# Patient Record
Sex: MALE | Race: WHITE | NOT HISPANIC OR LATINO | Employment: OTHER | URBAN - METROPOLITAN AREA
[De-identification: names, ages, dates, MRNs, and addresses within clinical notes are randomized per-mention and may not be internally consistent; named-entity substitution may affect disease eponyms.]

---

## 2018-07-03 ENCOUNTER — TELEPHONE (OUTPATIENT)
Dept: ORTHOPEDIC SURGERY | Facility: CLINIC | Age: 68
End: 2018-07-03

## 2018-07-05 ENCOUNTER — OFFICE VISIT (OUTPATIENT)
Dept: ORTHOPEDIC SURGERY | Facility: CLINIC | Age: 68
End: 2018-07-05

## 2018-07-05 ENCOUNTER — TELEPHONE (OUTPATIENT)
Dept: ORTHOPEDIC SURGERY | Facility: CLINIC | Age: 68
End: 2018-07-05

## 2018-07-05 DIAGNOSIS — S82.831D CLOSED FRACTURE OF DISTAL END OF RIGHT FIBULA WITH ROUTINE HEALING, UNSPECIFIED FRACTURE MORPHOLOGY, SUBSEQUENT ENCOUNTER: Primary | ICD-10-CM

## 2018-07-05 DIAGNOSIS — S82.891A CLOSED FRACTURE OF RIGHT ANKLE, INITIAL ENCOUNTER: Primary | ICD-10-CM

## 2018-07-05 DIAGNOSIS — I82.4Y1 DEEP VEIN THROMBOSIS (DVT) OF PROXIMAL VEIN OF RIGHT LOWER EXTREMITY, UNSPECIFIED CHRONICITY (HCC): ICD-10-CM

## 2018-07-05 PROCEDURE — 99203 OFFICE O/P NEW LOW 30 MIN: CPT | Performed by: ORTHOPAEDIC SURGERY

## 2018-07-05 PROCEDURE — 27786 TREATMENT OF ANKLE FRACTURE: CPT | Performed by: ORTHOPAEDIC SURGERY

## 2018-07-05 RX ORDER — WARFARIN SODIUM 5 MG/1
5 TABLET ORAL DAILY
Qty: 20 TABLET | Refills: 0 | Status: SHIPPED | OUTPATIENT
Start: 2018-07-05 | End: 2018-07-23 | Stop reason: SDUPTHER

## 2018-07-05 RX ORDER — SIMVASTATIN 40 MG
TABLET ORAL
COMMUNITY
Start: 2018-06-30

## 2018-07-05 NOTE — TELEPHONE ENCOUNTER
Order has been placed for PT/INR to be checked weekly at  lab. Patient wife has been informed. Per Dr. Francois they will start Coumadin today or tomorrow and check on Monday for 4 Mondays

## 2018-07-05 NOTE — PROGRESS NOTES
Chief Complaint   Patient presents with   • Right Ankle - Establish Care   Patient slipped while mowing and fell on the right ankle on 6-29-18. He is having constant pain with aching and burning along with bruising and swelling.        HPI patient was mowing the grass when he slipped on a wet surface.  He had a twisting injury to his ankle and could not walk on his ankle.  Cause of the pain and discomfort.  He since then has developed swelling and a burning sensation on the lateral aspect of the ankle.  He finds it very difficult to stand upright and bear weight on the right lower extremity.  The patient was seen in the emergency room and diagnosed with a distal fibular fracture.  He was placed in a splint and sent to our office for further management.  He states that his symptoms have gotten a little bit better since the time of his injury.  At this point he would like to proceed with nonoperative care.  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          Allergies   Allergen Reactions   • Phenergan [Promethazine] Unknown (See Comments)     Unknown           Social History     Social History   • Marital status:      Spouse name: N/A   • Number of children: N/A   • Years of education: N/A     Occupational History   • Not on file.     Social History Main Topics   • Smoking status: Never Smoker   • Smokeless tobacco: Not on file   • Alcohol use No   • Drug use: Unknown   • Sexual activity: Not on file     Other Topics Concern   • Not on file     Social History Narrative   • No narrative on file       Family History   Problem Relation Age of Onset   • Heart disease Mother    • Cancer Father        No past surgical history on file.    Past Medical History:   Diagnosis Date   • History of stomach ulcers    • Sleep apnea            There were no vitals filed for this visit.          Review of  Systems   Constitutional: Negative.    HENT: Negative.    Eyes: Negative.    Respiratory: Negative.    Cardiovascular: Negative.    Gastrointestinal: Negative.    Endocrine: Negative.    Genitourinary: Negative.    Musculoskeletal: Positive for gait problem and joint swelling.   Skin: Negative.    Allergic/Immunologic: Negative.    Hematological: Negative.    Psychiatric/Behavioral: Negative.            Physical Exam   Constitutional: He is oriented to person, place, and time. He appears well-nourished.   HENT:   Head: Atraumatic.   Eyes: EOM are normal.   Neck: Neck supple.   Cardiovascular: Regular rhythm, normal heart sounds and intact distal pulses.    Pulmonary/Chest: Breath sounds normal.   Abdominal: Bowel sounds are normal.   Musculoskeletal: Normal range of motion.   Neurological: He is oriented to person, place, and time.   Skin: Skin is warm. Capillary refill takes 2 to 3 seconds.   Psychiatric: He has a normal mood and affect. His behavior is normal.   Vitals reviewed.              Joint/Body Part Specific Exam:Right ankle:  The ankle is swollen. Patient has tenderness laterally over the distal fibula at the level of the syndesmosis. There is also some tenderness medially over the deltoid ligament. The syndesmosis itself is stable. Dorsiflexion and plantarflexion are both restricted for the patient and consistent with the ankle fracture both on account of stiffness and swelling caused by the injury. There is a mild amount of pedal edema on the distal tibia.  Calf is soft and nontender. Patient has a palpable dorsalis pedis artery and the common peroneal nerve function is well preserved. There is no clinical deformity. No issues related to the hardware are noted. There are no clinical signs of instability.  External rotation and squeeze tests are negative. There is no proximal fibular tenderness.           X-RAY Report:  X-rays from an outside facility are reviewed.  The images show a fracture of the  distal tip of the fibula.  There is greater swelling of the soft tissue on the lateral side than on the medial side.  The ankle mortise appears to be stable.          Diagnostics:            Jose D was seen today for establish care.    Diagnoses and all orders for this visit:    Closed fracture of distal end of right fibula with routine healing, unspecified fracture morphology, subsequent encounter            Procedures          I provided this patient with educational materials regarding exercise, bone health and cast or splint care.        Plan: Nonoperative management of this distal fibular fracture has been discussed with the patient.    Application of a tall cam walking boot to immobilize the fracture.    Elevation to control swelling.    Ace wrap from toes to groin to help decrease the swelling and minimize the possibility of a DVT.    Tablet ibuprofen 600 mg orally twice a day for pain swelling and discomfort.    Tablet aspirin 325 mg tab 1 by mouth daily for DVT prophylaxis.    Use a pair of crutches for assistance with ambulation.    Calcium and vitamin D for bone health.    Follow-up in my office in 4 weeks for reevaluation of the healing of the fracture.          CC To Alessia Fernandez MD

## 2018-07-05 NOTE — TELEPHONE ENCOUNTER
In that case I would recommend coumadin 5mg po q day with biweekly monitoring of PT INR for 4 weeks please call in tab coumadin 5 mg po q day # 20

## 2018-07-05 NOTE — TELEPHONE ENCOUNTER
Patient wife has called stating he cannot take Aspirin or any NSAIDS due to a perforated ulcer. He was here this morning. Is there anything else they can do? Please Advise.

## 2018-07-09 ENCOUNTER — LAB (OUTPATIENT)
Dept: LAB | Facility: HOSPITAL | Age: 68
End: 2018-07-09

## 2018-07-09 DIAGNOSIS — S82.891A CLOSED FRACTURE OF RIGHT ANKLE, INITIAL ENCOUNTER: ICD-10-CM

## 2018-07-09 DIAGNOSIS — I82.4Y1 DEEP VEIN THROMBOSIS (DVT) OF PROXIMAL VEIN OF RIGHT LOWER EXTREMITY, UNSPECIFIED CHRONICITY (HCC): ICD-10-CM

## 2018-07-09 LAB
INR PPP: 1.33 (ref 0.9–1.1)
PROTHROMBIN TIME: 16.3 SECONDS (ref 11.7–14.2)

## 2018-07-09 PROCEDURE — 36415 COLL VENOUS BLD VENIPUNCTURE: CPT

## 2018-07-09 PROCEDURE — 85610 PROTHROMBIN TIME: CPT

## 2018-07-11 ENCOUNTER — TELEPHONE (OUTPATIENT)
Dept: ORTHOPEDIC SURGERY | Facility: CLINIC | Age: 68
End: 2018-07-11

## 2018-07-11 NOTE — TELEPHONE ENCOUNTER
Have spoken with the patient's wife regarding his ProTime.  PT for Monday was 16.3 INR is 1.3.  Will increase his Coumadin to alternate 5 mg with 7-1/2 mg every other day and will recheck his ProTime again on Monday per Dr. Francois

## 2018-07-16 ENCOUNTER — LAB (OUTPATIENT)
Dept: LAB | Facility: HOSPITAL | Age: 68
End: 2018-07-16

## 2018-07-16 DIAGNOSIS — S82.891A CLOSED FRACTURE OF RIGHT ANKLE, INITIAL ENCOUNTER: ICD-10-CM

## 2018-07-16 DIAGNOSIS — I82.4Y1 DEEP VEIN THROMBOSIS (DVT) OF PROXIMAL VEIN OF RIGHT LOWER EXTREMITY, UNSPECIFIED CHRONICITY (HCC): ICD-10-CM

## 2018-07-16 LAB
INR PPP: 2.35 (ref 0.9–1.1)
PROTHROMBIN TIME: 25.3 SECONDS (ref 11.7–14.2)

## 2018-07-16 PROCEDURE — 85610 PROTHROMBIN TIME: CPT

## 2018-07-16 PROCEDURE — 36415 COLL VENOUS BLD VENIPUNCTURE: CPT

## 2018-07-17 ENCOUNTER — TELEPHONE (OUTPATIENT)
Dept: ORTHOPEDIC SURGERY | Facility: CLINIC | Age: 68
End: 2018-07-17

## 2018-07-17 NOTE — TELEPHONE ENCOUNTER
ProTime from yesterday was 25.3 INR is 2.3 will continue his Coumadin alternating 5 mg was 7.5 mg every other day.  Will recheck his ProTime again on Monday per Dr. Francois

## 2018-07-18 ENCOUNTER — OFFICE VISIT (OUTPATIENT)
Dept: ORTHOPEDIC SURGERY | Facility: CLINIC | Age: 68
End: 2018-07-18

## 2018-07-18 VITALS — WEIGHT: 180 LBS | BODY MASS INDEX: 25.77 KG/M2 | TEMPERATURE: 97.7 F | HEIGHT: 70 IN

## 2018-07-18 DIAGNOSIS — M25.551 HIP PAIN, CHRONIC, RIGHT: Primary | ICD-10-CM

## 2018-07-18 DIAGNOSIS — M16.11 PRIMARY OSTEOARTHRITIS OF RIGHT HIP: ICD-10-CM

## 2018-07-18 DIAGNOSIS — G89.29 HIP PAIN, CHRONIC, RIGHT: Primary | ICD-10-CM

## 2018-07-18 PROCEDURE — 73502 X-RAY EXAM HIP UNI 2-3 VIEWS: CPT | Performed by: ORTHOPAEDIC SURGERY

## 2018-07-18 PROCEDURE — 99213 OFFICE O/P EST LOW 20 MIN: CPT | Performed by: ORTHOPAEDIC SURGERY

## 2018-07-18 NOTE — PROGRESS NOTES
Chief Complaint   Patient presents with   • Right Hip - Establish Care, Pain             HPI Patient is here today for right hip pain.The patient continues to have pain in the right hip and the groin.  He has difficulty in getting out of the chair from a seated position.  He describes his pain as a dull aching discomfort that never really gets better.  Intermittently, the pain is very severe and sharp and stabbing in character.  There is an associated popping and clicking in the groin which is most likely resulting from a degenerative labrum tear.  His symptoms are definitely worse when he is driving for a prolonged period of time.  He states that this discomfort has been going on for several years and is slowly getting worse.  He would like to stay as active as he did a few years ago but finds it very difficult to mobilize himself because of the hip pain and discomfort.  Unfortunately, recently he fell and sustained an ankle fracture of the right side and is being treated conservatively for the ankle fracture.  The patient has been advised by me to wait for hip surgical intervention till systems of the ankle fracture has healed and he has completed his therapy program.  The patient states that he is ready for hip replacement surgery because of the progressive intensity of his symptoms.          Allergies   Allergen Reactions   • Phenergan [Promethazine] Unknown (See Comments)     Unknown           Social History     Social History   • Marital status:      Spouse name: N/A   • Number of children: N/A   • Years of education: N/A     Occupational History   • Not on file.     Social History Main Topics   • Smoking status: Never Smoker   • Smokeless tobacco: Not on file   • Alcohol use No   • Drug use: Unknown   • Sexual activity: Not on file     Other Topics Concern   • Not on file     Social History Narrative   • No narrative on file       Family History   Problem Relation Age of Onset   • Heart disease Mother     • Cancer Father        History reviewed. No pertinent surgical history.    Past Medical History:   Diagnosis Date   • History of stomach ulcers    • Sleep apnea            Vitals:    07/18/18 1232   Temp: 97.7 °F (36.5 °C)             Review of Systems   Constitutional: Negative.    HENT: Negative.    Eyes: Negative.    Respiratory: Negative.    Cardiovascular: Negative.    Gastrointestinal: Negative.    Endocrine: Negative.    Genitourinary: Negative.    Musculoskeletal: Positive for gait problem and joint swelling.   Skin: Negative.    Allergic/Immunologic: Negative.    Hematological: Negative.    Psychiatric/Behavioral: Negative.            Physical Exam   Constitutional: He is oriented to person, place, and time. He appears well-nourished.   HENT:   Head: Atraumatic.   Eyes: EOM are normal.   Neck: Neck supple.   Cardiovascular: Normal rate.    Pulmonary/Chest: Breath sounds normal.   Abdominal: Bowel sounds are normal.   Musculoskeletal: He exhibits edema.   Neurological: He is alert and oriented to person, place, and time.   Skin: Skin is warm. Capillary refill takes 2 to 3 seconds.   Psychiatric: He has a normal mood and affect. His behavior is normal. Judgment and thought content normal.   Nursing note and vitals reviewed.              Joint/Body Part Specific Exam:  right hip. Neurovascular status is intact. Patient has a distant limp on the affected side. Internal and external rotations are associated with pain and discomfort. Anterior joint line pain and tenderness is significant. Stinchfield sign is positive. Figure of 4 sign is positive. Patient is unable to perform an active straight leg raise exam. Greater trochanter is tender. Crossover adduction test is positive. Cross body adduction is limited and painful for the patient. Patient has very significant limp and joint line tenderness anteriorly, posteriorly and medially. Dorsalis pedis and posterior tibial artery pulses are palpable. Common peroneal  nerve function is well preserved.          X-RAY Report:  right Hip X-Ray  Indication: Pain in the right hip with a limp  AP and lateral  Findings: Advanced osteoarthritis of the hip on the right side with complete loss of joint space and osteophyte formation  no bony lesion  Soft tissues within normal limits  decreased joint spaces  Hardware appropriately positioned not applicable      yes prior studies available for comparison.    X-RAY was ordered and reviewed by Brian Francois MD              Diagnostics:            Jose D was seen today for establish care and pain.    Diagnoses and all orders for this visit:    Hip pain, chronic, right  -     XR Hip With or Without Pelvis 2 - 3 View Right            Procedures          I provided this patient with educational materials regarding exercise and bone health.        Plan: Stretching and strengthening exercises of the hip flexors and abductors.    Tablet ibuprofen 600 mg orally twice a day for pain swelling and discomfort.    Calcium and vitamin D for bone health.    This patient is going to need a right hip replacement to manage his symptoms of pain and discomfort.    At this point the patient is recovering from a ankle fracture on the right side and therefore it is too early to schedule hip replacement surgery for him since he will not be able to participate both in the surgical process with an anterior approach and the subsequent physical therapy required after the hip arthroplasty.    Follow-up in my office in 4 weeks for reevaluation of the healing of the ankle fracture and then proceeding with hip replacement scheduling.    Risks and benefits and potential complications of the hip replacement surgery discussed with the patient and his family in detail in the office today.    Patient is being anticoagulated at this point because of his immobility from an ankle fracture that will up and get his cast and the risk of DVT is subsided significantly.      CC To  Alessia Fernandez MD

## 2018-07-19 PROBLEM — S82.831D CLOSED FRACTURE OF DISTAL END OF RIGHT FIBULA WITH ROUTINE HEALING: Status: ACTIVE | Noted: 2018-07-19

## 2018-07-23 ENCOUNTER — LAB (OUTPATIENT)
Dept: LAB | Facility: HOSPITAL | Age: 68
End: 2018-07-23

## 2018-07-23 ENCOUNTER — TELEPHONE (OUTPATIENT)
Dept: ORTHOPEDIC SURGERY | Facility: CLINIC | Age: 68
End: 2018-07-23

## 2018-07-23 DIAGNOSIS — S82.891A CLOSED FRACTURE OF RIGHT ANKLE, INITIAL ENCOUNTER: ICD-10-CM

## 2018-07-23 DIAGNOSIS — M25.551 RIGHT HIP PAIN: Primary | ICD-10-CM

## 2018-07-23 DIAGNOSIS — I82.4Y1 DEEP VEIN THROMBOSIS (DVT) OF PROXIMAL VEIN OF RIGHT LOWER EXTREMITY, UNSPECIFIED CHRONICITY (HCC): ICD-10-CM

## 2018-07-23 LAB
INR PPP: 2.6 (ref 0.9–1.1)
PROTHROMBIN TIME: 27.5 SECONDS (ref 11.7–14.2)

## 2018-07-23 PROCEDURE — 36415 COLL VENOUS BLD VENIPUNCTURE: CPT

## 2018-07-23 PROCEDURE — 85610 PROTHROMBIN TIME: CPT

## 2018-07-23 RX ORDER — WARFARIN SODIUM 5 MG/1
5 TABLET ORAL DAILY
Qty: 20 TABLET | Refills: 0 | Status: SHIPPED | OUTPATIENT
Start: 2018-07-23 | End: 2020-06-16

## 2018-07-23 NOTE — TELEPHONE ENCOUNTER
ProTime today was 27.5 INR is 2.6.  Have instructed the patient's wife to have him continue taking Coumadin alternating 5 mg was 7.5 mg every other day and will recheck his ProTime again on Monday per Dr. Francois

## 2018-07-23 NOTE — TELEPHONE ENCOUNTER
Coumadin has been entered and sent to pharmacy and that he needs to continue taking it like he was.  Then I called the patient letting her know what NABILA wants in regards tot he Coumadin.

## 2018-07-23 NOTE — TELEPHONE ENCOUNTER
Okay to refill the Coumadin.  Okay to take it as he has been taking it previously.  However please make sure that the patient gets in touch with their PCP so that they can control the dosage of the Coumadin and the check on the PT/INR regularly.

## 2018-07-24 ENCOUNTER — TELEPHONE (OUTPATIENT)
Dept: ORTHOPEDIC SURGERY | Facility: CLINIC | Age: 68
End: 2018-07-24

## 2018-07-24 NOTE — TELEPHONE ENCOUNTER
Have notified the patient that he will be on the Coumadin for a total of 4 weeks and after that Dr. Francois is recommending aspirin 325 mg 1 tablet daily for 4 weeks.  The patient has a history of ulcers and has had stomach surgery and cannot take any kind of aspirin due to increased risk of bleeding.  Discussed with Dr. Francois and then get back with the patient

## 2018-07-24 NOTE — TELEPHONE ENCOUNTER
----- Message from Brian Francois MD sent at 7/23/2018 10:48 PM EDT -----  Yes please.  Left us follow the PT/INR levels.  And really at about 4 weeks we can discontinue the Coumadin and place him on some 325 aspirin a day for another 4 weeks.Thanks  ----- Message -----  From: Mary Mcneil RN  Sent: 7/23/2018   4:54 PM  To: Brian Francois MD    This patient was started on Coumadin by you on 7/5/18 for DVT prophylaxis after an ankle injury.  I was instructed by your MA to keep him on it for 4 weeks.  However, when a message was sent to you about refill and questions about Coumadin, you told MA to have him follow up with PCP.  Patient was not on long term Coumadin.  Are you OK if I continue to follow it for the 4 weeks

## 2018-07-25 ENCOUNTER — TELEPHONE (OUTPATIENT)
Dept: ORTHOPEDIC SURGERY | Facility: CLINIC | Age: 68
End: 2018-07-25

## 2018-07-25 NOTE — TELEPHONE ENCOUNTER
Have discussed with Dr. Francois.  Patient apparently is unable to take aspirin due to GI intolerance any past history of bleeding ulcers.  Patient will be 4 weeks postop at the end of this week.  Have advised him just to continue his Coumadin through Sunday of this week and then he can DC his Coumadin and no longer needs to have a pro times drawn per Dr. Francois

## 2018-07-29 PROBLEM — M16.11 PRIMARY OSTEOARTHRITIS OF RIGHT HIP: Status: ACTIVE | Noted: 2018-07-29

## 2018-08-07 ENCOUNTER — OFFICE VISIT (OUTPATIENT)
Dept: ORTHOPEDIC SURGERY | Facility: CLINIC | Age: 68
End: 2018-08-07

## 2018-08-07 DIAGNOSIS — M16.11 PRIMARY OSTEOARTHRITIS OF RIGHT HIP: ICD-10-CM

## 2018-08-07 DIAGNOSIS — S82.891D CLOSED FRACTURE OF RIGHT ANKLE WITH ROUTINE HEALING, SUBSEQUENT ENCOUNTER: Primary | ICD-10-CM

## 2018-08-07 PROCEDURE — 99024 POSTOP FOLLOW-UP VISIT: CPT | Performed by: ORTHOPAEDIC SURGERY

## 2018-08-07 PROCEDURE — 73600 X-RAY EXAM OF ANKLE: CPT | Performed by: ORTHOPAEDIC SURGERY

## 2018-08-07 NOTE — PROGRESS NOTES
Chief Complaint   Patient presents with   • Right Ankle - Follow-up   Patient is here today following up on his right ankle.      HPI the patient is doing very well with his right distal fibular fracture which has been treated with conservative, nonoperative care.  He does not have a clinical deformity.  The swelling and bruising are subsiding consistent with a healing fracture of the distal fibula.  Unfortunately his right hip continues to bother him significantly.  His pain and discomfort of the hip has actually become a little bit worse because of the altered weightbearing dynamics of the lower extremity since he sustained this ankle fracture about 6-8 weeks ago.  He states that he is thinking about a hip replacement surgery in the near future to improve his quality of life and to maintain his high level of mobility without pain.        There were no vitals filed for this visit.        Joint/Body Part Specific Exam:  Right ankle: The tenderness over the distal fibular fracture has started to settle down.  Dorsiflexion 0-20°.  Plantar flexion 0-30°.  He is able to circumduct his ankle without too much difficulty.  Skin and soft tissue swelling have subsided considerably.  Dorsalis pedis artery pulses are palpable.  Gait is cautious but otherwise completely normal.  The syndesmosis is stable.      X-RAY REPORT:  right Ankle X-Ray  Indication: Evaluation of healing of a distal fibular fracture  Views: AP, Lateral  Findings: Anatomically acceptable position of the fracture fragments with callus formation noted  yes fracture  no bony lesion  Soft tissues within normal limits  within normal limits joint spaces  Hardware appropriately positioned not applicable    yes prior studies available for comparison.    X-RAY was ordered and reviewed by Brian Francois MD        Jose D was seen today for follow-up.    Diagnoses and all orders for this visit:    Closed fracture of right ankle with routine healing, subsequent  encounter  -     XR Ankle 2 View Right            Procedures        Instructions:      Plan: Wean off the cam walking boot.    Application of an active ankle brace to help support the bone to heal and the soft tissues to heal as well.    Reinjury precautions.    Calcium and vitamin D for bone health.    Home-based exercise program with stretching and strengthening exercises of the dorsi and plantar flexors of the ankle.    The patient is going to need consideration for hip replacement surgery in the near future and we have talked about that.  I would like to defer surgery until such time that his ankle has fully resolved and he is able to ambulate with a weightbearing as tolerated status.  Patient will discuss the condition of his hip and the proposed procedures at his next visit to the office.    Follow-up in 2 months for reevaluation.

## 2018-08-14 PROBLEM — S82.891A CLOSED FRACTURE OF RIGHT ANKLE: Status: ACTIVE | Noted: 2018-08-14

## 2018-10-09 ENCOUNTER — OFFICE VISIT (OUTPATIENT)
Dept: ORTHOPEDIC SURGERY | Facility: CLINIC | Age: 68
End: 2018-10-09

## 2018-10-09 DIAGNOSIS — M16.11 PRIMARY OSTEOARTHRITIS OF RIGHT HIP: ICD-10-CM

## 2018-10-09 DIAGNOSIS — S82.891D CLOSED FRACTURE OF RIGHT ANKLE WITH ROUTINE HEALING, SUBSEQUENT ENCOUNTER: ICD-10-CM

## 2018-10-09 DIAGNOSIS — M25.551 RIGHT HIP PAIN: Primary | ICD-10-CM

## 2018-10-09 PROCEDURE — 73600 X-RAY EXAM OF ANKLE: CPT | Performed by: ORTHOPAEDIC SURGERY

## 2018-10-09 PROCEDURE — 99214 OFFICE O/P EST MOD 30 MIN: CPT | Performed by: ORTHOPAEDIC SURGERY

## 2018-10-09 PROCEDURE — 72170 X-RAY EXAM OF PELVIS: CPT | Performed by: ORTHOPAEDIC SURGERY

## 2018-10-09 NOTE — PROGRESS NOTES
Chief Complaint   Patient presents with   • Right Ankle - Follow-up   • Right Hip - Follow-up   Patient is here today following up on a right ankle fracture as well as right hip pain.      HPI the patient's ankle fracture is healing well.  Range of motion is improved significantly.  He does not have a clinical deformity.  The patient states that his pain has decreased considerably.  At this point he wants to go back to the preoperative planning stage for his hip replacement surgery.  In fact, we were planning to schedule this surgery earlier this year when he fell and unfortunately broke his ankle.  That put the plans for hip arthroplasty on file.  The patient states that the hip replacement is now very important for him to improve his mobility her maintain his quality of life.  He states that now that his ankle feels well with the healing process his hip hurts and continuously.  He has tried anti-inflammatory medication as well as crutches and a walker but still has quite a bit of pain and discomfort area internal and external rotation of the hip bother him significantly.  By the end of the day, he has a very significant limp.  His right leg with significant pain in the groin.  There are no risk factors for avascular necrosis.        There were no vitals filed for this visit.        Joint/Body Part Specific Exam:  right hip. Neurovascular status is intact. Patient has a distant limp on the affected side. Internal and external rotations are associated with pain and discomfort. Anterior joint line pain and tenderness is significant. Stinchfield sign is positive. Figure of 4 sign is positive. Patient is unable to perform an active straight leg raise exam. Greater trochanter is tender. Crossover adduction test is positive. Cross body adduction is limited and painful for the patient. Patient has very significant limp and joint line tenderness anteriorly, posteriorly and medially. Dorsalis pedis and posterior tibial  artery pulses are palpable. Common peroneal nerve function is well preserved.    Right ankle: The tenderness over the distal fibula is completely subsided.  He is neurovascularly intact.  Dorsiflexion 0-20°.  Plantar flexion 0-30°.  There is no clinical deformity.  The syndesmosis is stable.  The patient can circumduct his ankle without too much difficulty.  Gait is cautious and somewhat tentative because of the pain and swelling.  X-RAY REPORT:  Pelvis X-Ray  Indication: Evaluation of pain and discomfort of the right hip  AP and Frogleg views  Findings: Advanced degenerative osteoarthritis of the hip with narrowing of the joint space and subchondral sclerosis  no bony lesion  Soft tissues within normal limits  decreased joint spaces  Hardware appropriately positioned not applicable      yes prior studies available for comparison.    X-RAY was ordered and reviewed by Brian Francois MD    right Ankle X-Ray  Indication: Evaluation of healing of a distal fibular fracture  Views: AP, Lateral  Findings: Healed distal fibular, [place] type B fracture  yes fracture  no bony lesion  Soft tissues within normal limits  within normal limits joint spaces  Hardware appropriately positioned not applicable    yes prior studies available for comparison.    X-RAY was ordered and reviewed by Brian Francois MD        Jose D was seen today for follow-up and follow-up.    Diagnoses and all orders for this visit:    Right hip pain  -     XR Pelvis 1 or 2 View    Closed fracture of right ankle with routine healing, subsequent encounter  -     XR Ankle 2 View Right            Procedures        Instructions:      Plan: The patient's ankle is healing really well at this point.    Elevation to control swelling.  When necessary purposes.    Uses supportive brace on the ankle.    He has tried using a wheelchair, crutches, walker as well as ibuprofen for the pain and discomfort in his right hip.  He wants to now start with the process of the hip  arthroplasty on the right side.    The patient states he wants to improve his quality of life and not be dependent on its for walking.    Risks and benefits of total hip arthroplasty discussed with the patient as well as his wife was present in the office today.    Time spent in the office today with the patient is 30 minutes in the discussion of the surgical planning and the rationale for surgical intervention.    Greater than 50% of my time was spent face-to-face with this patient going over the treatment options, the risks benefits and postoperative management of hip replacement surgery.    The patient was seen today for preoperative discussion.  The patient has been tried on over-the-counter and prescription NSAID's despite the risks of anti-inflammatory bleeding, peptic ulcers and erosive gastritis with short term benefit only.  Braces have been prescribed for mechanical support.  Patient has been participating in an exercise program specifically targeting joint pain relief with limited benefit. Intraarticular injections have been used periodically with some but not complete relief of pain.  Ambulation aids have also been utilized.      The details of the surgical procedure were explained including the location of probable incisions and a description of the likely hardware/grafts to be used. The patient understands the likely convalescence after surgery as well as the rehabilitation required.  Also, we have thoroughly discussed with the patient the risks, benefits and alternatives to surgery.  Risks include but are not limited to the risk of infection, joint stiffness, limited range of motion, wound healing problems, scar tissue build up, myocardial infarction, stroke, blood clots (including DVT and/or pulmonary embolus along with the risk of death) neurologic and/or vascular injury, limb length discrepancy, fracture, dislocation, nonunion, malunion, continued pain and need for further surgery including  hardware failure requiring revision.

## 2018-10-23 ENCOUNTER — TELEPHONE (OUTPATIENT)
Dept: ORTHOPEDIC SURGERY | Facility: CLINIC | Age: 68
End: 2018-10-23

## 2018-11-26 ENCOUNTER — OUTSIDE FACILITY SERVICE (OUTPATIENT)
Dept: ORTHOPEDIC SURGERY | Facility: CLINIC | Age: 68
End: 2018-11-26

## 2018-11-26 PROCEDURE — 27130 TOTAL HIP ARTHROPLASTY: CPT | Performed by: ORTHOPAEDIC SURGERY

## 2018-11-27 ENCOUNTER — TELEPHONE (OUTPATIENT)
Dept: ORTHOPEDIC SURGERY | Facility: CLINIC | Age: 68
End: 2018-11-27

## 2018-11-27 NOTE — TELEPHONE ENCOUNTER
SNEHA FROM [facility] IN [place] CALLED REQUESTING A PHYSICAL THERAPY ORDER FOR PATIENT AND DR. CALDWELL'S PROTOCOL BE FAXED [phone].  PATIENT IS S/P RIGHT TOTAL HIP REPLACEMENT 11/26/18.  PATIENT BEGINS THERAPY 11/28/18

## 2018-11-29 ENCOUNTER — TELEPHONE (OUTPATIENT)
Dept: ORTHOPEDIC SURGERY | Facility: CLINIC | Age: 68
End: 2018-11-29

## 2018-11-29 RX ORDER — ONDANSETRON 4 MG/1
4 TABLET, FILM COATED ORAL EVERY 8 HOURS PRN
Qty: 40 TABLET | Refills: 1 | Status: SHIPPED | OUTPATIENT
Start: 2018-11-29 | End: 2020-06-16

## 2018-12-07 ENCOUNTER — TELEPHONE (OUTPATIENT)
Dept: ORTHOPEDIC SURGERY | Facility: CLINIC | Age: 68
End: 2018-12-07

## 2018-12-07 NOTE — TELEPHONE ENCOUNTER
DAHIANA FROM DR. KAILA CASTELLANO'S OFFICE (PATIENT'S PCP) CALLED AND STATED THAT PATIENT HAS GROSS HEMATURIA AND DR. CASTELLANO BELIEVES THAT IT'S A RESULT OF THE XARELTO THAT PATIENT IS TAKING.  PATIENT IS S/P RIGHT TOTAL HIP REPLACEMENT ON 11/26/18.  DR. CASTELLANO WANTS TO KNOW DR. CALDWELL'S RECOMMENDATION ON HOW TO PROCEED.  PLEASE ADVISE.

## 2018-12-07 NOTE — TELEPHONE ENCOUNTER
D/D Xarelsidney. It is okay for the pt to take Aspirin 325MG by mouth daily for 30 days. Thanks SARAH

## 2018-12-07 NOTE — TELEPHONE ENCOUNTER
CALLED DAHIANA @ DR. SRIVASTAVA'S OFFICE AND GAVE HER DR. CALDWELL'S INSTRUCTIONS.  PATIENT'S WIFE WAS SIMULTANEOUSLY ON THE PHONE WITH [facility] AND WAS TOLD THAT PATIENT SHOULD DISCONTINUE XARELTO AND BEGIN ASPIRIN THERAPY.  PATIENT IS ALLERGIC TO ASPIRIN SO DR. CALDWELL STATED FOR PATIENT NOT TO TAKE ANYTHING.  ALL PARTIES HAVE BEEN ADVISED AND IF DR. SRIVASTAVA CHANGES ANYTHING, SHE WILL CONTACT PATIENT AND COMMUNICATE WITH OUR OFFICE AS WELL

## 2018-12-14 ENCOUNTER — OFFICE VISIT (OUTPATIENT)
Dept: ORTHOPEDIC SURGERY | Facility: CLINIC | Age: 68
End: 2018-12-14

## 2018-12-14 DIAGNOSIS — Z96.641 STATUS POST TOTAL HIP REPLACEMENT, RIGHT: Primary | ICD-10-CM

## 2018-12-14 PROCEDURE — 73502 X-RAY EXAM HIP UNI 2-3 VIEWS: CPT | Performed by: ORTHOPAEDIC SURGERY

## 2018-12-14 PROCEDURE — 99024 POSTOP FOLLOW-UP VISIT: CPT | Performed by: ORTHOPAEDIC SURGERY

## 2018-12-14 NOTE — PROGRESS NOTES
Chief Complaint   Patient presents with   • Right Hip - Post-op         HPI the patient is here today for a follow up of his right total hip arthroplasty that was done on 11/26/18.  The patient is doing extremely well postoperatively.  There is no limb length discrepancy.  His neurovascular status is intact.  He is ambulating well with the assistance of a walker.  Physical therapy is helping him improve his mobility.  He does not have any significant pain at this point.  He is not using any narcotics to control his pain.  There are no fevers, rigors or chills.  The patient is very pleased with his postoperative outcome.        There were no vitals filed for this visit.        Joint/Body Part Specific Exam:right hip. Patient is post op 2.5 week(s) from total hip arthoplasty, direct anterior. Incision is clean and healing well. Calf is soft and nontender. Homans sign is negative. Skin and soft tissues are appropriate for postoperative status of the patient. Hip flexion is 0-80° degrees, hip abduction is 0-to 30° degrees. No limb lenth discrepancy. Neurovascular status is intact. Patients gait is significantly improved. The pain relief is extremely dramatic for the patient. Dorsalis pedis and posterior tibial artery pulses palpable. Common peroneal function is well preserved. There is no evidence of cellulitis or erythema or deep seated joint infection.        X-RAY REPORT:        Jose D was seen today for post-op.    Diagnoses and all orders for this visit:    Status post total hip replacement, right            Procedures        Instructions:      Plan: Incision care.    Falls precautions.    Dislocation precaution.    Tablet ibuprofen 600 mg orally twice a day for pain swelling and discomfort.    Continue with outpatient physical therapy to improve the range of motion and to improve the gait patterns.    Patient could not complete his course of xarelto for DVT prophylaxis.  We have discussed about the use of aspirin  as well as mechanical prophylaxis to prevent a DVT following hip replacement surgery.    Follow-up in my office in 8 weeks for reevaluation.

## 2018-12-15 PROBLEM — Z96.641 STATUS POST TOTAL HIP REPLACEMENT, RIGHT: Status: ACTIVE | Noted: 2018-12-15

## 2019-02-21 ENCOUNTER — OFFICE VISIT (OUTPATIENT)
Dept: ORTHOPEDIC SURGERY | Facility: CLINIC | Age: 69
End: 2019-02-21

## 2019-02-21 DIAGNOSIS — M16.11 PRIMARY OSTEOARTHRITIS OF RIGHT HIP: ICD-10-CM

## 2019-02-21 DIAGNOSIS — Z96.641 STATUS POST TOTAL HIP REPLACEMENT, RIGHT: Primary | ICD-10-CM

## 2019-02-21 PROCEDURE — 99024 POSTOP FOLLOW-UP VISIT: CPT | Performed by: ORTHOPAEDIC SURGERY

## 2019-09-05 ENCOUNTER — OFFICE VISIT (OUTPATIENT)
Dept: ORTHOPEDIC SURGERY | Facility: CLINIC | Age: 69
End: 2019-09-05

## 2019-09-05 VITALS — WEIGHT: 174 LBS | TEMPERATURE: 98.1 F | BODY MASS INDEX: 25.77 KG/M2 | HEIGHT: 69 IN

## 2019-09-05 DIAGNOSIS — M25.512 LEFT SHOULDER PAIN, UNSPECIFIED CHRONICITY: Primary | ICD-10-CM

## 2019-09-05 DIAGNOSIS — Z96.641 STATUS POST TOTAL HIP REPLACEMENT, RIGHT: ICD-10-CM

## 2019-09-05 PROCEDURE — 99213 OFFICE O/P EST LOW 20 MIN: CPT | Performed by: ORTHOPAEDIC SURGERY

## 2019-09-05 PROCEDURE — 73030 X-RAY EXAM OF SHOULDER: CPT | Performed by: ORTHOPAEDIC SURGERY

## 2019-09-05 NOTE — PROGRESS NOTES
NEW/FOLLOW UP VISIT    Patient: Jose D Bocanegra  ?  YOB: 1950    MRN: [id number]  ?  Chief Complaint   Patient presents with   • Left Shoulder - Pain      ?  HPI: The patient is following up on a total hip arthroplasty.  His hip doing really well and he doesn't even think about that joint anymore.  He is very pleased with the outcome of his hip arthroplasty.  He is now complaining of his left shoulder.  He has weakness in abduction and difficulty in sleeping in bed at night.  Most of his symptoms are marked at the 90 degree abduction meliton.  He has pain with cross body activities and denies any history of trauma.  He states that the pain is on the lateral side of the arm and associated with some numbness and tingling.  I am concerned about a rotator cuff injury.  We will check an MRI of the shoulder to evaluate the integrity of the rotator cuff versus a labrum tear.    Pain Location: left shoulder(s)  Radiation: none  Quality: sharp, stabbing  Intensity/Severity: moderate  Duration: 2-3 years  Onset quality: gradual   Timing: intermittent  Aggravating Factors: reaching forward, reaching backward, reaching across the body, repetitive motion  Alleviating Factors: OTC analgesics, NSAID's  Previous Episodes: yes  Associated Symptoms: swelling, decreased ROM, decreased strength  ADLs Affected: grooming/hygiene/toileting/personal care, work related activities, recreational activities/sports  Previous Treatment: nsaids and PT    This patient is an established patient.  This problem is new to this examiner.      Allergies:   Allergies   Allergen Reactions   • Aspirin Other (See Comments) and GI Intolerance     History of ulcers   • Phenergan [Promethazine] Unknown (See Comments)     Unknown         Medications:   Home Medications:  Current Outpatient Medications on File Prior to Visit   Medication Sig   • ondansetron (ZOFRAN) 4 MG tablet Take 1 tablet by mouth Every 8 (Eight) Hours As Needed for Nausea or  "Vomiting.   • simvastatin (ZOCOR) 40 MG tablet    • warfarin (COUMADIN) 5 MG tablet Take 1 tablet by mouth Daily.     No current facility-administered medications on file prior to visit.      Current Medications:  Scheduled Meds:  PRN Meds:.    I have reviewed the patient's medical history in detail and updated the computerized patient record.  Review and summarization of old records include:    Past Medical History:   Diagnosis Date   • History of stomach ulcers    • Sleep apnea      No past surgical history on file.  Social History     Occupational History   • Not on file   Tobacco Use   • Smoking status: Never Smoker   Substance and Sexual Activity   • Alcohol use: No   • Drug use: Not on file   • Sexual activity: Not on file      Social History     Social History Narrative   • Not on file     Family History   Problem Relation Age of Onset   • Heart disease Mother    • Cancer Father          Review of Systems   Constitutional: Negative.  Negative for fever.   HENT: Negative.    Eyes: Negative.    Respiratory: Negative.    Cardiovascular: Negative.    Gastrointestinal: Negative.    Endocrine: Negative.    Genitourinary: Negative.    Musculoskeletal: Positive for arthralgias, gait problem and joint swelling.   Skin: Negative.  Negative for rash and wound.   Allergic/Immunologic: Negative.    Neurological: Negative for numbness.   Hematological: Negative.    Psychiatric/Behavioral: Negative.           Wt Readings from Last 3 Encounters:   09/05/19 78.9 kg (174 lb)   07/18/18 81.6 kg (180 lb)     Ht Readings from Last 3 Encounters:   09/05/19 175.3 cm (69\")   07/18/18 177.8 cm (70\")     Body mass index is 25.7 kg/m².  Facility age limit for growth percentiles is 20 years.  Vitals:    09/05/19 1101   Temp: 98.1 °F (36.7 °C)         Physical Exam  Constitutional: Patient is oriented to person, place, and time. Appears well-developed and well-nourished.   HENT:   Head: Normocephalic and atraumatic.   Eyes: Conjunctivae " and EOM are normal. Pupils are equal, round, and reactive to light.   Cardiovascular: Normal rate, regular rhythm, normal heart sounds and intact distal pulses.   Pulmonary/Chest: Effort normal and breath sounds normal.   Musculoskeletal:   See detailed exam below   Neurological: Alert and oriented to person, place, and time. No sensory deficit. Coordination normal.   Skin: Skin is warm and dry. Capillary refill takes less than 2 seconds. No rash noted. No erythema.   Psychiatric: Patient has a normal mood and affect. His behavior is normal. Judgment and thought content normal.   Nursing note and vitals reviewed.      Ortho Exam:   Left shoulder (rct). The shoulder is extremely tender anteriorly. There is tenderness over the insertion of the rotator cuff over the greater tuberosity of the humerus. Slight proximal migration of the humeral head is noted. AC joint is tender. Crossover adduction test is positive. Drop arm sign is positive. Forward flexion is 0-110 degrees, abduction is 0-110 degrees, external rotation is 0-40 degrees. Patient has mild atrophy both of the supra and infraspinatus fossa. Sulcus sign is negative. There is no evidence of multidirectional instability. Neer test is positive on compression. Skin and soft tissue tenderness is noted. Patient's strength in abduction and external rotation are extremely lacking. The pain level is 6.    Right hip. Patient is post op several month(s) from total hip arthoplasty, direct anterior. Incision is clean and healing well. Calf is soft and nontender. Homans sign is negative. Skin and soft tissues are appropriate for postoperative status of the patient. Hip flexion is 0-90 degrees, hip abduction is 0-30 degrees. No limb lenth discrepancy. Neurovascular status is intact. Patients gait is significantly improved. The pain relief is extremely dramatic for the patient. Dorsalis pedis and posterior tibial artery pulses palpable. Common peroneal function is well  preserved. There is no evidence of cellulitis or erythema or deep seated joint infection.        Diagnostics:  xrays obtained today  left Shoulder X-Ray  Indication: Evaluation of pain and discomfort with limited range of motion of the shoulder.  AP and Lateral  Findings: Narrowing of the AC joint space with sclerosis over the greater tuberosity the humerus.  no bony lesion  Soft tissues decreased  within normal limits joint spaces  Hardware appropriately positioned not applicable      no prior studies available for comparison.    X-RAY was ordered and reviewed by Brian Francois MD        Assessment:  Jose D was seen today for pain.    Diagnoses and all orders for this visit:    Left shoulder pain, unspecified chronicity  -     XR Shoulder 2+ View Left  -     MRI shoulder left wo contrast; Future          Procedures  ?    Plan    · Schedule an MRI of the left shoulder for evaluation of intra-articular pathology such as a cuff tear versus a labrum tear that might require surgical intervention.  · Rest, ice, compression, and elevation (RICE) therapy  · Stretching and strengthening exercises of the rotator cuff muscles to prevent arthrofibrosis and a frozen shoulder.  · Intra-articular steroid injection for shoulder symptom control discussed and offered to the patient.  He will think about it and let me know.    · Dislocation precautions for the total hip arthroplasty.  · , GI and dental procedure prophylaxis with antibiotics to prevent metastatic infection to the hip arthroplasty implants.  · Tylenol 500-1000mg by mouth every 6 hours as needed for pain   · Follow up in 6 week(s) to discuss the findings of the MRI and to make further recommendations for any possible surgical intervention based on the pathology.    Date of encounter: 09/05/2019   Brian Francois MD

## 2019-09-17 ENCOUNTER — OFFICE VISIT (OUTPATIENT)
Dept: ORTHOPEDIC SURGERY | Facility: CLINIC | Age: 69
End: 2019-09-17

## 2019-09-17 VITALS — WEIGHT: 176.6 LBS | HEIGHT: 70 IN | BODY MASS INDEX: 25.28 KG/M2

## 2019-09-17 DIAGNOSIS — M75.42 SUBACROMIAL IMPINGEMENT OF LEFT SHOULDER: Primary | ICD-10-CM

## 2019-09-17 DIAGNOSIS — M25.512 ACUTE PAIN OF LEFT SHOULDER: ICD-10-CM

## 2019-09-17 PROCEDURE — 20610 DRAIN/INJ JOINT/BURSA W/O US: CPT | Performed by: PHYSICIAN ASSISTANT

## 2019-09-17 PROCEDURE — 99214 OFFICE O/P EST MOD 30 MIN: CPT | Performed by: PHYSICIAN ASSISTANT

## 2019-09-17 RX ADMIN — METHYLPREDNISOLONE ACETATE 160 MG: 80 INJECTION, SUSPENSION INTRA-ARTICULAR; INTRALESIONAL; INTRAMUSCULAR; SOFT TISSUE at 14:54

## 2019-09-17 RX ADMIN — LIDOCAINE HYDROCHLORIDE 2 ML: 10 INJECTION, SOLUTION EPIDURAL; INFILTRATION; INTRACAUDAL; PERINEURAL at 14:54

## 2019-09-17 NOTE — PROGRESS NOTES
Large Joint Arthrocentesis: L subacromial bursa  Date/Time: 9/17/2019 2:54 PM  Consent given by: patient  Site marked: site marked  Timeout: Immediately prior to procedure a time out was called to verify the correct patient, procedure, equipment, support staff and site/side marked as required   Supporting Documentation  Indications: pain   Procedure Details  Location: shoulder - L subacromial bursa  Preparation: Patient was prepped and draped in the usual sterile fashion  Needle size: 25 G  Approach: anteromedial  Medications administered: 2 mL lidocaine PF 1% 1 %; 160 mg methylPREDNISolone acetate 80 MG/ML  Patient tolerance: patient tolerated the procedure well with no immediate complications      Electronically signed by Kamar Jacob PA-C, 09/18/19, 12:18 PM.

## 2019-09-18 PROBLEM — M25.512 ACUTE PAIN OF LEFT SHOULDER: Status: ACTIVE | Noted: 2019-09-18

## 2019-09-18 PROBLEM — M75.42 SUBACROMIAL IMPINGEMENT OF LEFT SHOULDER: Status: ACTIVE | Noted: 2019-09-18

## 2019-09-18 RX ORDER — LIDOCAINE HYDROCHLORIDE 10 MG/ML
2 INJECTION, SOLUTION EPIDURAL; INFILTRATION; INTRACAUDAL; PERINEURAL
Status: COMPLETED | OUTPATIENT
Start: 2019-09-17 | End: 2019-09-17

## 2019-09-18 RX ORDER — METHYLPREDNISOLONE ACETATE 80 MG/ML
160 INJECTION, SUSPENSION INTRA-ARTICULAR; INTRALESIONAL; INTRAMUSCULAR; SOFT TISSUE
Status: COMPLETED | OUTPATIENT
Start: 2019-09-17 | End: 2019-09-17

## 2019-09-18 NOTE — PROGRESS NOTES
FOLLOW UP VISIT    Patient: Jose D Bocanegra  ?  YOB: 1950    MRN: [id number]  ?  Chief Complaint   Patient presents with   • Left Shoulder - Results      ?  HPI:   Mr. Bocanegra is a 69-year-old male patient who is been seen Dr. Francois for left shoulder pain and left total hip arthroplasty.  He returns today for follow-up on left shoulder pain and follow-up on MRI results of left shoulder.  He initially first came with the complaint of shoulder pain at his visit on 9/5/2019 with Dr. Francois.  He has not had any specific injury but had developed pain when sleeping on the affected shoulder at night as well as weakness in abduction.  He continues to experience most of the pain when shoulder is lifted to a 90 degree angle.  Since his visit with Dr. Francois he does say it has improved some although he can still notice the pain is present.  Pain Location: left shoulder(s)  Radiation: none  Quality: sharp, stabbing  Intensity/Severity: moderate  Duration: 2-3 years  Onset quality: gradual   Timing: intermittent  Aggravating Factors: reaching forward, reaching backward, reaching across the body, repetitive motion  Alleviating Factors: OTC analgesics, NSAID's  Previous Episodes: yes  Associated Symptoms: swelling, decreased ROM, decreased strength  ADLs Affected: grooming/hygiene/toileting/personal care, work related activities, recreational activities/sports  Previous Treatment: nsaids and PT    This patient is an established patient.  This problem is new to this examiner.      Allergies:   Allergies   Allergen Reactions   • Promethazine Unknown (See Comments) and Other (See Comments)     Unknown    Shaking/tremors   • Aspirin Other (See Comments) and GI Intolerance     History of ulcers       Medications:   Home Medications:  Current Outpatient Medications on File Prior to Visit   Medication Sig   • ondansetron (ZOFRAN) 4 MG tablet Take 1 tablet by mouth Every 8 (Eight) Hours As Needed for Nausea or Vomiting.  "  • simvastatin (ZOCOR) 40 MG tablet    • warfarin (COUMADIN) 5 MG tablet Take 1 tablet by mouth Daily.     No current facility-administered medications on file prior to visit.      Current Medications:  Scheduled Meds:  PRN Meds:.    I have reviewed the patient's medical history in detail and updated the computerized patient record.  Review and summarization of old records include:    Past Medical History:   Diagnosis Date   • History of stomach ulcers    • Sleep apnea      No past surgical history on file.  Social History     Occupational History   • Not on file   Tobacco Use   • Smoking status: Never Smoker   Substance and Sexual Activity   • Alcohol use: No   • Drug use: Not on file   • Sexual activity: Not on file      Social History     Social History Narrative   • Not on file     Family History   Problem Relation Age of Onset   • Heart disease Mother    • Cancer Father          Review of Systems  Constitutional: Negative.    HENT: Negative.    Eyes: Negative.    Respiratory: Negative.    Cardiovascular: Negative.    Endocrine: Negative.    Musculoskeletal: Positive for arthralgias, decreased ROM, decreased strength.  Skin: Negative.    Allergic/Immunologic: Negative.    Neurological: Negative.    Hematological: Negative.    Psychiatric/Behavioral: Negative.           Wt Readings from Last 3 Encounters:   09/17/19 80.1 kg (176 lb 9.6 oz)   09/05/19 78.9 kg (174 lb)   07/18/18 81.6 kg (180 lb)     Ht Readings from Last 3 Encounters:   09/17/19 177.8 cm (70\")   09/05/19 175.3 cm (69\")   07/18/18 177.8 cm (70\")     Body mass index is 25.34 kg/m².  Facility age limit for growth percentiles is 20 years.  There were no vitals filed for this visit.      Physical Exam  Constitutional: Patient is oriented to person, place, and time. Appears well-developed and well-nourished.   HENT:   Head: Normocephalic and atraumatic.   Eyes: Conjunctivae and EOM are normal. Pupils are equal, round, and reactive to light. "   Cardiovascular: Normal rate and intact distal pulses.   Pulmonary/Chest: Effort normal and breath sounds normal.   Musculoskeletal:   See detailed exam below   Neurological: Alert and oriented to person, place, and time. No sensory deficit. Coordination normal.   Skin: Skin is warm and dry. Capillary refill takes less than 2 seconds. No rash noted. No erythema.   Psychiatric: Patient has a normal mood and affect. His behavior is normal. Judgment and thought content normal.   Nursing note and vitals reviewed.      Ortho Exam:   left shoulder-impingement. Patient has signs of impingement with internal and external rotation. There is a lot of pain and tenderness for the patient over the subcromial bursa. Peters's sign is positive. Neer sign is positive. Forward flexion is 0-120 degrees, abduction is 0-120 degrees, external rotation is 0-40 degrees. Rotator cuff function is fairly well preserved except for the impingement at 90 degrees. Apprehension sign is negative. Axillary nerve function is well preserved. Radial artery pulses are palpable. There is no evidence of multidirectional instability. Sulcus sign is negative. Drop arm sign is negative. The patient has some ill-defined tenderness over the greater tuberosity of the humerus. The pain level is 4.      Diagnostics:  Reviewed MRI report of shoulder without contrast, summary of impression below:  · No definite evidence of supraspinatus tendon tear  · Hypertrophic changes at the acromioclavicular joint with mild underlying impingement         Assessment:  Jose D was seen today for results.    Diagnoses and all orders for this visit:    Subacromial impingement of left shoulder  -     Large Joint Arthrocentesis: L subacromial bursa    Acute pain of left shoulder  -     Large Joint Arthrocentesis: L subacromial bursa        Procedures  See separate procedure note      Plan    · Discussion of MRI findings in combination with patient's symptoms and appropriate  treatment options.  · Steroid injection discussed-Risks and benefits of injection therapy discussed with patient.  Possibility of bruising, pain, swelling, infection, increased blood sugar levels and soft tissue atrophy discussed in detail.  Patient would like to proceed with injection today.  · Injected patient's left shoulder joint(s)with Steroid from a(n) anteromedial approach.  Patient tolerated the procedure well and no complications were encountered.  · Physical Therapy discussed   · Activity modifications discussed and recommended  · Rest, ice, compression, and elevation (RICE) therapy  · Alternate OTC Ibuprofen and Tylenol as needed/if clinically indicated  · Follow up in 3 month(s)  · Time spent with patient: 25 minutes face-to-face with greater than 50% spent in counseling and coordination of care.  This interaction includes discussion of MRI findings, evaluating patient's symptoms and physical exam and discussing how to proceed with his care.    Date of encounter: 09/17/2019   Kamar Jacob PA-C    Electronically signed by Kamar Jacob PA-C, 09/18/19, 12:18 PM.

## 2019-12-17 ENCOUNTER — OFFICE VISIT (OUTPATIENT)
Dept: ORTHOPEDIC SURGERY | Facility: CLINIC | Age: 69
End: 2019-12-17

## 2019-12-17 VITALS — BODY MASS INDEX: 25.74 KG/M2 | TEMPERATURE: 97.5 F | HEIGHT: 70 IN | WEIGHT: 179.8 LBS

## 2019-12-17 DIAGNOSIS — M75.42 SUBACROMIAL IMPINGEMENT OF LEFT SHOULDER: Primary | ICD-10-CM

## 2019-12-17 PROCEDURE — 20610 DRAIN/INJ JOINT/BURSA W/O US: CPT | Performed by: PHYSICIAN ASSISTANT

## 2019-12-17 RX ORDER — OMEPRAZOLE 40 MG/1
40 CAPSULE, DELAYED RELEASE ORAL
Refills: 12 | COMMUNITY
Start: 2019-11-09 | End: 2021-11-29

## 2019-12-17 RX ORDER — AMOXICILLIN 500 MG/1
CAPSULE ORAL
Refills: 3 | COMMUNITY
Start: 2019-10-01 | End: 2020-06-16

## 2019-12-17 RX ORDER — MONTELUKAST SODIUM 10 MG/1
10 TABLET ORAL DAILY
Refills: 0 | COMMUNITY
Start: 2019-10-14

## 2019-12-17 RX ADMIN — LIDOCAINE HYDROCHLORIDE 2 ML: 10 INJECTION, SOLUTION EPIDURAL; INFILTRATION; INTRACAUDAL; PERINEURAL at 13:05

## 2019-12-17 RX ADMIN — METHYLPREDNISOLONE ACETATE 160 MG: 80 INJECTION, SUSPENSION INTRA-ARTICULAR; INTRALESIONAL; INTRAMUSCULAR; SOFT TISSUE at 13:05

## 2019-12-17 NOTE — PROGRESS NOTES
Large Joint Arthrocentesis: L subacromial bursa  Date/Time: 12/17/2019 1:05 PM  Consent given by: patient  Site marked: site marked  Timeout: Immediately prior to procedure a time out was called to verify the correct patient, procedure, equipment, support staff and site/side marked as required   Supporting Documentation  Indications: pain   Procedure Details  Location: shoulder - L subacromial bursa  Preparation: Patient was prepped and draped in the usual sterile fashion  Needle size: 25 G  Approach: anteromedial  Medications administered: 160 mg methylPREDNISolone acetate 80 MG/ML; 2 mL lidocaine PF 1% 1 %  Patient tolerance: patient tolerated the procedure well with no immediate complications        Electronically signed by Kamar Jacob PA-C, 12/24/19, 8:11 AM.

## 2019-12-24 RX ORDER — LIDOCAINE HYDROCHLORIDE 10 MG/ML
2 INJECTION, SOLUTION EPIDURAL; INFILTRATION; INTRACAUDAL; PERINEURAL
Status: COMPLETED | OUTPATIENT
Start: 2019-12-17 | End: 2019-12-17

## 2019-12-24 RX ORDER — METHYLPREDNISOLONE ACETATE 80 MG/ML
160 INJECTION, SUSPENSION INTRA-ARTICULAR; INTRALESIONAL; INTRAMUSCULAR; SOFT TISSUE
Status: COMPLETED | OUTPATIENT
Start: 2019-12-17 | End: 2019-12-17

## 2019-12-24 NOTE — PROGRESS NOTES
INJECTION    Patient: Jose D Bocanegra    YOB: 1950    MRN: [id number]    Chief Complaint   Patient presents with   • Left Shoulder - Follow-up, Pain       History of Present Illness:  Patient returns today for follow-up on left shoulder pain. His pain is generalized in the shoulder, has been progressive and remains intermittent . His pain is worsened by reaching forward, reaching backward, reaching across the body, repetitive motion, overhead reaching and improved with NSAIDS and injections.  He reports the injections work well and last just over 2 months, wearing off in the last 2 to 3 weeks before next injection is due.    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     Allergies:   Allergies   Allergen Reactions   • Promethazine Unknown (See Comments) and Other (See Comments)     Unknown    Shaking/tremors   • Aspirin Other (See Comments) and GI Intolerance     History of ulcers       Medications:   Home Medications:  Current Outpatient Medications on File Prior to Visit   Medication Sig   • amoxicillin (AMOXIL) 500 MG capsule TAKE FOUR CAPSULES BY MOUTH ONE HOUR BEFORE APPOINTMENT AND TAKE 4 CAPSULES ONE HOUR AFTER APPOINTMENT.   • montelukast (SINGULAIR) 10 MG tablet Take 10 mg by mouth Daily.   • omeprazole (priLOSEC) 40 MG capsule Take 40 mg by mouth every night at bedtime.   • ondansetron (ZOFRAN) 4 MG tablet Take 1 tablet by mouth Every 8 (Eight) Hours As Needed for Nausea or Vomiting.   • simvastatin (ZOCOR) 40 MG tablet    • warfarin (COUMADIN) 5 MG tablet Take 1 tablet by mouth Daily.     No current facility-administered medications on file prior to visit.      Current Medications:  Scheduled Meds:  PRN Meds:.    I have reviewed the patient's medical history in detail and updated the computerized patient record.  Review and summarization of old records include:    Past Medical History:   Diagnosis Date   • History of stomach ulcers    • Sleep apnea      Past Surgical History:   Procedure  "Laterality Date   • HIP SURGERY     • JOINT REPLACEMENT Right     YANETH-DA     Social History     Occupational History   • Not on file   Tobacco Use   • Smoking status: Never Smoker   Substance and Sexual Activity   • Alcohol use: No   • Drug use: Not on file   • Sexual activity: Not on file      Social History     Social History Narrative   • Not on file     Family History   Problem Relation Age of Onset   • Heart disease Mother    • Cancer Father        Review of Systems:  Constitutional: Negative.    Eyes: Negative.    Respiratory: Negative.    Cardiovascular: Negative.    Endocrine: Negative.    Musculoskeletal: Positive for joint pain, decreased ROM, decreased strength.   Skin: Negative.    Allergic/Immunologic: Negative.    Neurological: Negative.    Hematological: Negative.    Psychiatric/Behavioral: Negative.            Wt Readings from Last 3 Encounters:   12/17/19 81.6 kg (179 lb 12.8 oz)   09/17/19 80.1 kg (176 lb 9.6 oz)   09/05/19 78.9 kg (174 lb)     Ht Readings from Last 3 Encounters:   12/17/19 177.8 cm (70\")   09/17/19 177.8 cm (70\")   09/05/19 175.3 cm (69\")     Body mass index is 25.8 kg/m².  Facility age limit for growth percentiles is 20 years.  Vitals:    12/17/19 1303   Temp: 97.5 °F (36.4 °C)       Physical Exam   Constitutional: He is oriented to person, place, and time. He appears well-developed and well-nourished.   Cardiovascular: Intact distal pulses.   Pulmonary/Chest: Effort normal.   Musculoskeletal:   See focused/detailed physical exam   Neurological: He is alert and oriented to person, place, and time.   Skin: Skin is warm and dry. No rash noted. No erythema.   Psychiatric: He has a normal mood and affect. His behavior is normal. Judgment and thought content normal.   Nursing note and vitals reviewed.      Musculoskeletal Exam:    Left shoulder:  Upon exam there is tenderness with palpation of the subacromial bursa and signs of impingement with internal and external rotation.  Overall " rotator cuff function is well preserved except for the impingement at 90 degrees.  Forward flexion is 0-120 degrees, abduction is 0 to 120 degrees, external rotation is 0 to 40 degrees.  Peters sign is positive, Neer sign is positive, drop arm sign is negative, sulcus sign is negative, apprehension sign is negative.  Axillary nerve function is well preserved.  Radial artery pulses palpable and within normal limits.  No evidence of multidirectional instability of the shoulder.  Pain level 5/10.      Diagnostics:  no diagnostic testing performed this visit    Procedure:  See separate procedure note      Assessment:   Jose D was seen today for follow-up and pain.    Diagnoses and all orders for this visit:    Subacromial impingement of left shoulder  -     Large Joint Arthrocentesis: L subacromial bursa          Plan:   · Injected patient's left shoulder joint(s)with Steroid from an anteromedial approach   · Rest, ice, compression, and elevation (RICE) therapy  · OTC Alternate Ibuprofen and Tylenol as needed  · Follow up in 3 month(s)    Date of encounter: 12/17/2019   Kamar Jacob PA-C    Electronically signed by Kamar Jacob PA-C, 12/24/19, 8:24 AM.

## 2020-02-27 ENCOUNTER — OFFICE VISIT (OUTPATIENT)
Dept: ORTHOPEDIC SURGERY | Facility: CLINIC | Age: 70
End: 2020-02-27

## 2020-02-27 VITALS — TEMPERATURE: 98.1 F | WEIGHT: 180.4 LBS | BODY MASS INDEX: 25.83 KG/M2 | HEIGHT: 70 IN

## 2020-02-27 DIAGNOSIS — Z96.641 STATUS POST TOTAL HIP REPLACEMENT, RIGHT: ICD-10-CM

## 2020-02-27 DIAGNOSIS — Z96.643 S/P BILATERAL REVISION OF TOTAL HIP REPLACEMENT: Primary | ICD-10-CM

## 2020-02-27 PROCEDURE — 99213 OFFICE O/P EST LOW 20 MIN: CPT | Performed by: ORTHOPAEDIC SURGERY

## 2020-02-27 PROCEDURE — 73502 X-RAY EXAM HIP UNI 2-3 VIEWS: CPT | Performed by: ORTHOPAEDIC SURGERY

## 2020-02-27 RX ORDER — FAMOTIDINE 20 MG/1
20 TABLET, FILM COATED ORAL 2 TIMES DAILY
COMMUNITY
Start: 2020-02-20 | End: 2020-06-16

## 2020-02-27 RX ORDER — AMOXICILLIN AND CLAVULANATE POTASSIUM 875; 125 MG/1; MG/1
1 TABLET, FILM COATED ORAL EVERY 12 HOURS
COMMUNITY
Start: 2020-02-03 | End: 2020-06-16

## 2020-02-27 RX ORDER — ALBUTEROL SULFATE 90 UG/1
AEROSOL, METERED RESPIRATORY (INHALATION) SEE ADMIN INSTRUCTIONS
COMMUNITY
Start: 2020-02-05

## 2020-03-02 PROBLEM — Z96.643 S/P BILATERAL REVISION OF TOTAL HIP REPLACEMENT: Status: ACTIVE | Noted: 2020-03-02

## 2020-03-17 ENCOUNTER — OFFICE VISIT (OUTPATIENT)
Dept: ORTHOPEDIC SURGERY | Facility: CLINIC | Age: 70
End: 2020-03-17

## 2020-03-17 VITALS — BODY MASS INDEX: 25.65 KG/M2 | TEMPERATURE: 98 F | WEIGHT: 179.2 LBS | HEIGHT: 70 IN

## 2020-03-17 DIAGNOSIS — M25.512 LEFT SHOULDER PAIN, UNSPECIFIED CHRONICITY: Primary | ICD-10-CM

## 2020-03-17 PROCEDURE — 20610 DRAIN/INJ JOINT/BURSA W/O US: CPT | Performed by: PHYSICIAN ASSISTANT

## 2020-03-17 RX ORDER — METHYLPREDNISOLONE ACETATE 80 MG/ML
160 INJECTION, SUSPENSION INTRA-ARTICULAR; INTRALESIONAL; INTRAMUSCULAR; SOFT TISSUE
Status: COMPLETED | OUTPATIENT
Start: 2020-03-17 | End: 2020-03-17

## 2020-03-17 RX ORDER — LIDOCAINE HYDROCHLORIDE 10 MG/ML
2 INJECTION, SOLUTION EPIDURAL; INFILTRATION; INTRACAUDAL; PERINEURAL
Status: COMPLETED | OUTPATIENT
Start: 2020-03-17 | End: 2020-03-17

## 2020-03-17 RX ADMIN — METHYLPREDNISOLONE ACETATE 160 MG: 80 INJECTION, SUSPENSION INTRA-ARTICULAR; INTRALESIONAL; INTRAMUSCULAR; SOFT TISSUE at 13:34

## 2020-03-17 RX ADMIN — LIDOCAINE HYDROCHLORIDE 2 ML: 10 INJECTION, SOLUTION EPIDURAL; INFILTRATION; INTRACAUDAL; PERINEURAL at 13:34

## 2020-03-17 NOTE — PROGRESS NOTES
INJECTION      Patient: Jose D Bocanegra    MRN: [id number]    YOB: 1950    Chief Complaint   Patient presents with   • Left Shoulder - Follow-up, Pain   • Injections         History of Present Illness:  Jose D Bocanegra is here today for injection therapy. He receives left shoulder injections. Since last injection, patient notes substantial relief of symptoms up until 2 to 3 weeks ago. No adverse effects of prior injection noted. Options have been discussed and he understands and consents.       Physical Exam:   70 y.o. male awake, alert, oriented, in no acute distress and well developed, well nourished.  Positive for pain and tenderness with palpation over the subcromial bursa.   Positive for signs of impingement with internal and external rotation.   Forward flexion is 0- 120 degrees, abduction is 0-120 degrees, external rotation is 0-40 degrees.   Rotator cuff function is fairly well preserved except impingement at 90 degrees.   Peters's sign is positive. Neer sign is positive.   Sulcus sign is negative. Drop arm sign is negative.   Apprehension sign is negative.   Axillary nerve function is well preserved. Radial artery pulses are palpable.   There is no evidence of multidirectional instability.   The pain level is 5.      Procedure: See separate procedure note    Injection site was identified by physical examination and cleaned with Betadine and alcohol swabs. Prior to needle insertion, ethyl chloride spray was used for surface anesthesia. Sterile technique was used.       ASSESSMENT:  Jose D was seen today for injections, follow-up and pain.    Diagnoses and all orders for this visit:    Left shoulder pain, unspecified chronicity    Other orders  -     Large Joint Arthrocentesis: L glenohumeral          PLAN:   • Left shoulder steroid injection was discussed with the patient. Discussed indication, risks, benefits, and alternatives. Verbal consent was given to proceed with the procedure.    • Injection was performed from anteromedial approach.  Patient tolerated the procedure well and no complications were encountered.  • Discussion of orthopedic goals and activities and patient/guardian expressed understanding.  • Ice, heat, rest, compression and elevation of extremity as beneficial  • nsaids and/or tylenol as beneficial  • Instructed to refrain from heavy activity/rest the extremity for the next 24-48 hours  • Discussion regarding possibility of cortisol flare and what to expect if this occurs  • Watch for signs and symptoms of infection  • Call if adverse effect from injection therapy  • Follow up in 3 months    Kamar Jacob PA-C  Encounter Date: 3/17/2020    Electronically signed by Kamar Jacob PA-C, 03/17/20, 2:24 PM.      EMR Dragon/Transcription disclaimer:  Much of this encounter note is an electronic transcription/translation of spoken language to printed text. The electronic translation of spoken language may permit erroneous, or at times, nonsensical words or phrases to be inadvertently transcribed; Although I have reviewed the note for such errors, some may still exist.

## 2020-03-17 NOTE — PROGRESS NOTES
Procedure   Large Joint Arthrocentesis: L glenohumeral  Date/Time: 3/17/2020 1:34 PM  Consent given by: patient  Site marked: site marked  Timeout: Immediately prior to procedure a time out was called to verify the correct patient, procedure, equipment, support staff and site/side marked as required   Supporting Documentation  Indications: pain and joint swelling   Procedure Details  Location: shoulder - L glenohumeral  Preparation: Patient was prepped and draped in the usual sterile fashion  Needle size: 25 G  Approach: anteromedial  Medications administered: 2 mL lidocaine PF 1% 1 %; 160 mg methylPREDNISolone acetate 80 MG/ML  Patient tolerance: patient tolerated the procedure well with no immediate complications      Electronically signed by Kamar Jacob PA-C, 03/17/20, 2:23 PM.

## 2020-06-16 ENCOUNTER — CLINICAL SUPPORT (OUTPATIENT)
Dept: ORTHOPEDIC SURGERY | Facility: CLINIC | Age: 70
End: 2020-06-16

## 2020-06-16 VITALS — BODY MASS INDEX: 25.62 KG/M2 | HEIGHT: 69 IN | WEIGHT: 173 LBS | TEMPERATURE: 96.4 F

## 2020-06-16 DIAGNOSIS — M75.42 SUBACROMIAL IMPINGEMENT OF LEFT SHOULDER: Primary | ICD-10-CM

## 2020-06-16 PROCEDURE — 20610 DRAIN/INJ JOINT/BURSA W/O US: CPT | Performed by: PHYSICIAN ASSISTANT

## 2020-06-16 RX ORDER — LIDOCAINE HYDROCHLORIDE 10 MG/ML
2 INJECTION, SOLUTION EPIDURAL; INFILTRATION; INTRACAUDAL; PERINEURAL
Status: COMPLETED | OUTPATIENT
Start: 2020-06-16 | End: 2020-06-16

## 2020-06-16 RX ORDER — METHYLPREDNISOLONE ACETATE 80 MG/ML
160 INJECTION, SUSPENSION INTRA-ARTICULAR; INTRALESIONAL; INTRAMUSCULAR; SOFT TISSUE
Status: COMPLETED | OUTPATIENT
Start: 2020-06-16 | End: 2020-06-16

## 2020-06-16 RX ADMIN — METHYLPREDNISOLONE ACETATE 160 MG: 80 INJECTION, SUSPENSION INTRA-ARTICULAR; INTRALESIONAL; INTRAMUSCULAR; SOFT TISSUE at 14:01

## 2020-06-16 RX ADMIN — LIDOCAINE HYDROCHLORIDE 2 ML: 10 INJECTION, SOLUTION EPIDURAL; INFILTRATION; INTRACAUDAL; PERINEURAL at 14:01

## 2020-06-16 NOTE — PROGRESS NOTES
Large Joint Arthrocentesis: L subacromial bursa  Date/Time: 6/16/2020 2:01 PM  Consent given by: patient  Site marked: site marked  Timeout: Immediately prior to procedure a time out was called to verify the correct patient, procedure, equipment, support staff and site/side marked as required   Supporting Documentation  Indications: pain   Procedure Details  Location: shoulder - L subacromial bursa  Preparation: Patient was prepped and draped in the usual sterile fashion  Needle size: 25 G  Approach: anteromedial  Medications administered: 160 mg methylPREDNISolone acetate 80 MG/ML; 2 mL lidocaine PF 1% 1 %  Patient tolerance: patient tolerated the procedure well with no immediate complications      Electronically signed by Kamar Jacob PA-C, 06/16/20, 2:14 PM.

## 2020-06-16 NOTE — PROGRESS NOTES
INJECTION      Patient: Jose D Bocanegra    MRN: [id number]    YOB: 1950    Chief Complaint   Patient presents with   • Left Shoulder - Follow-up, Pain         History of Present Illness:  Jose D Bocanegra is here today for injection therapy. He receives left shoulder injections of steroid. Since last injection, patient notes substantial relief of symptoms till approximately 2 weeks before his next scheduled injection. Treatment options have been discussed and he understands and consents.       Physical Exam:   70 y.o. male awake, alert, oriented, in no acute distress and well developed, well nourished.  Positive for pain and tenderness with palpation over the subcromial bursa.   Positive for signs of impingement with internal and external rotation.   Forward flexion is 0- 120 degrees, abduction is 0-120 degrees, external rotation is 0-40 degrees.   Rotator cuff function is fairly well preserved except impingement at 90 degrees.   Peters's sign is positive. Neer sign is positive.   Sulcus sign is negative. Drop arm sign is negative.   Apprehension sign is negative.   Axillary nerve function is well preserved. Radial artery pulses are palpable.   There is no evidence of multidirectional instability.   The pain level is 6-7.      Procedures  See separate procedure note  Injection site was identified by physical examination and cleaned with Betadine and alcohol swabs. Prior to needle insertion, ethyl chloride spray was used for surface anesthesia. Sterile technique was used.       ASSESSMENT:  Jose D was seen today for follow-up and pain.    Diagnoses and all orders for this visit:    Subacromial impingement of left shoulder  -     Large Joint Arthrocentesis: L subacromial bursa          PLAN:   • Left shoulder steroid injection was discussed with the patient. Discussed indication, risks, benefits, and alternatives. Verbal consent was given to proceed with the procedure.   • Injection was performed from  anteromedial approach.  Patient tolerated the procedure well and no complications were encountered.  • Discussion of orthopedic goals and activities and patient/guardian expressed understanding.  • Ice, heat, rest, compression and elevation of extremity as beneficial  • nsaids and/or tylenol as beneficial  • Instructed to refrain from heavy activity/rest the extremity for the next 24-48 hours  • Discussion regarding possibility of cortisol flare and what to expect if this occurs  • Watch for signs and symptoms of infection  • Call if adverse effect from injection therapy  • Follow up in 3 months      Kamar Jacob PA-C  Encounter Date: 6/16/2020    Electronically signed by Kamar Jacob PA-C, 06/16/20, 2:14 PM.      EMR Dragon/Transcription disclaimer:  Much of this encounter note is an electronic transcription/translation of spoken language to printed text. The electronic translation of spoken language may permit erroneous, or at times, nonsensical words or phrases to be inadvertently transcribed; Although I have reviewed the note for such errors, some may still exist.

## 2020-09-21 ENCOUNTER — CLINICAL SUPPORT (OUTPATIENT)
Dept: ORTHOPEDIC SURGERY | Facility: CLINIC | Age: 70
End: 2020-09-21

## 2020-09-21 VITALS — HEIGHT: 70 IN | TEMPERATURE: 98 F | WEIGHT: 175 LBS | BODY MASS INDEX: 25.05 KG/M2

## 2020-09-21 DIAGNOSIS — M25.512 LEFT SHOULDER PAIN, UNSPECIFIED CHRONICITY: ICD-10-CM

## 2020-09-21 DIAGNOSIS — M75.42 SUBACROMIAL IMPINGEMENT OF LEFT SHOULDER: Primary | ICD-10-CM

## 2020-09-21 PROCEDURE — 20610 DRAIN/INJ JOINT/BURSA W/O US: CPT | Performed by: PHYSICIAN ASSISTANT

## 2020-09-21 RX ORDER — METHYLPREDNISOLONE ACETATE 80 MG/ML
160 INJECTION, SUSPENSION INTRA-ARTICULAR; INTRALESIONAL; INTRAMUSCULAR; SOFT TISSUE
Status: COMPLETED | OUTPATIENT
Start: 2020-09-21 | End: 2020-09-21

## 2020-09-21 RX ORDER — LIDOCAINE HYDROCHLORIDE 10 MG/ML
2 INJECTION, SOLUTION INFILTRATION; PERINEURAL
Status: COMPLETED | OUTPATIENT
Start: 2020-09-21 | End: 2020-09-21

## 2020-09-21 RX ADMIN — METHYLPREDNISOLONE ACETATE 160 MG: 80 INJECTION, SUSPENSION INTRA-ARTICULAR; INTRALESIONAL; INTRAMUSCULAR; SOFT TISSUE at 13:37

## 2020-09-21 RX ADMIN — LIDOCAINE HYDROCHLORIDE 2 ML: 10 INJECTION, SOLUTION INFILTRATION; PERINEURAL at 13:37

## 2020-09-21 NOTE — PROGRESS NOTES
Procedure   Large Joint Arthrocentesis: L glenohumeral  Date/Time: 9/21/2020 1:37 PM  Consent given by: patient  Site marked: site marked  Timeout: Immediately prior to procedure a time out was called to verify the correct patient, procedure, equipment, support staff and site/side marked as required   Supporting Documentation  Indications: pain and joint swelling   Procedure Details  Location: shoulder - L glenohumeral  Preparation: Patient was prepped and draped in the usual sterile fashion  Needle size: 25 G  Approach: anteromedial  Medications administered: 2 mL lidocaine 1 %; 160 mg methylPREDNISolone acetate 80 MG/ML  Patient tolerance: patient tolerated the procedure well with no immediate complications      Electronically signed by Kamar Jacob PA-C, 09/21/20, 2:12 PM EDT.

## 2020-09-21 NOTE — PROGRESS NOTES
INJECTION      Patient: Jose D Bocanegra    MRN: [id number]    YOB: 1950    Chief Complaint   Patient presents with   • Left Shoulder - Follow-up, Pain   • Injections         History of Present Illness:  Jose D Bocanegra is here today for injection therapy. He receives LEFT shoulder injections of steroid. Since last injection, patient notes substantial relief of symptoms until the last 2 weeks. Treatment options have been discussed and he understands and consents.       Physical Exam:   70 y.o. male awake, alert, oriented, in no acute distress and well developed, well nourished.  LEFT shoulder  Positive for pain and tenderness with palpation over the subcromial bursa.   Positive for signs of impingement with internal and external rotation.   Forward flexion is 0- 120 degrees, abduction is 0-120 degrees, external rotation is 0-40 degrees.   Rotator cuff function is fairly well preserved except impingement at 90 degrees.   Peters's sign is positive. Neer sign is positive.   Sulcus sign is negative. Drop arm sign is negative.   Apprehension sign is negative.   Axillary nerve function is well preserved. Radial artery pulses are palpable.   There is no evidence of multidirectional instability.   The pain level is 5.      Procedures  See separate procedure note  Injection site was identified by physical examination and cleaned with Betadine and alcohol swabs. Prior to needle insertion, ethyl chloride spray was used for surface anesthesia. Sterile technique was used.       ASSESSMENT:  Jose D was seen today for injections, follow-up and pain.    Diagnoses and all orders for this visit:    Subacromial impingement of left shoulder  -     Large Joint Arthrocentesis: L glenohumeral    Left shoulder pain, unspecified chronicity  -     Large Joint Arthrocentesis: L glenohumeral          PLAN:   • Left shoulder steroid injection was discussed with the patient. Discussed indication, risks, benefits, and alternatives.  Verbal consent was given to proceed with the procedure.   • Injection was performed from anteromedial approach.  Patient tolerated the procedure well and no complications were encountered.  • Discussion of orthopedic goals and activities and patient/guardian expressed understanding.  • Ice, heat, rest, compression and elevation of extremity as beneficial  • nsaids and/or tylenol as beneficial  • Instructed to refrain from heavy activity/rest the extremity for the next 24-48 hours  • Discussion regarding possibility of cortisol flare and what to expect if this occurs  • Watch for signs and symptoms of infection  • Call if adverse effect from injection therapy  • Follow up in 3 months      Kamar Jacob PA-C  Encounter Date: 9/21/2020    Electronically signed by Kamar Jacob PA-C, 09/21/20, 2:12 PM EDT.      EMR Dragon/Transcription disclaimer:  Much of this encounter note is an electronic transcription/translation of spoken language to printed text. The electronic translation of spoken language may permit erroneous, or at times, nonsensical words or phrases to be inadvertently transcribed; Although I have reviewed the note for such errors, some may still exist.

## 2020-12-21 ENCOUNTER — CLINICAL SUPPORT (OUTPATIENT)
Dept: ORTHOPEDIC SURGERY | Facility: CLINIC | Age: 70
End: 2020-12-21

## 2020-12-21 VITALS — TEMPERATURE: 97.6 F | WEIGHT: 175 LBS | HEIGHT: 70 IN | BODY MASS INDEX: 25.05 KG/M2

## 2020-12-21 DIAGNOSIS — M75.42 SUBACROMIAL IMPINGEMENT OF LEFT SHOULDER: Primary | ICD-10-CM

## 2020-12-21 PROCEDURE — 20610 DRAIN/INJ JOINT/BURSA W/O US: CPT | Performed by: PHYSICIAN ASSISTANT

## 2020-12-21 RX ORDER — FLUTICASONE PROPIONATE 50 MCG
1 SPRAY, SUSPENSION (ML) NASAL DAILY
COMMUNITY
Start: 2020-12-01

## 2020-12-21 RX ORDER — METHYLPREDNISOLONE ACETATE 80 MG/ML
160 INJECTION, SUSPENSION INTRA-ARTICULAR; INTRALESIONAL; INTRAMUSCULAR; SOFT TISSUE
Status: COMPLETED | OUTPATIENT
Start: 2020-12-21 | End: 2020-12-21

## 2020-12-21 RX ADMIN — METHYLPREDNISOLONE ACETATE 160 MG: 80 INJECTION, SUSPENSION INTRA-ARTICULAR; INTRALESIONAL; INTRAMUSCULAR; SOFT TISSUE at 13:15

## 2020-12-21 NOTE — PROGRESS NOTES
INJECTION      Patient: Jose D Bocanegra    MRN: [id number]    YOB: 1950    Chief Complaint   Patient presents with   • Left Shoulder - Follow-up, Pain   • Injections         History of Present Illness:  Jose D Bocanegra is here today for injection therapy. He receives LEFT shoulder injections of steroid. Since last injection, patient notes substantial relief of symptoms until the last several days. Usually the shot wears off in the last few weeks but it has lasted longer than usual this time. He does report having to continuously watch how much he is lifting due to the pain.  Treatment options have been discussed and he understands and consents.       Physical Exam:   70 y.o. male awake, alert, oriented, in no acute distress and well developed, well nourished.  LEFT shoulder  Positive for pain and tenderness with palpation over the subcromial bursa.   Positive for mild pain with palpation over the posterior aspect of the shoulder.  Positive for signs of impingement with internal and external rotation.   Forward flexion is 0- 120 degrees, abduction is 0-120 degrees, external rotation is 0-40 degrees.   Rotator cuff function is fairly well preserved except impingement at 90 degrees.   Peters's sign is positive. Neer sign is positive.   Sulcus sign is negative. Drop arm sign is negative.   Apprehension sign is negative.   Axillary nerve function is well preserved. Radial artery pulses are palpable.   There is no evidence of multidirectional instability.   The pain level is 4.      PROCEDURE  Large Joint Arthrocentesis: L subacromial bursa  Date/Time: 12/21/2020 1:15 PM  Consent given by: patient  Site marked: site marked  Timeout: Immediately prior to procedure a time out was called to verify the correct patient, procedure, equipment, support staff and site/side marked as required   Supporting Documentation  Indications: pain   Procedure Details  Location: shoulder - L subacromial bursa  Preparation:  Patient was prepped and draped in the usual sterile fashion  Needle size: 25 G  Approach: anteromedial  Medications administered: 160 mg methylPREDNISolone acetate 80 MG/ML; 2 mL lidocaine (cardiac)  Patient tolerance: patient tolerated the procedure well with no immediate complications      Injection site was identified by physical examination and cleaned with Betadine and alcohol swabs. Prior to needle insertion, ethyl chloride spray was used for surface anesthesia. Sterile technique was used.       ASSESSMENT:  Diagnoses and all orders for this visit:    1. Subacromial impingement of left shoulder (Primary)  -     Large Joint Arthrocentesis: L subacromial bursa          PLAN:   • Left shoulder steroid injection was discussed with the patient. Discussed indication, risks, benefits, and alternatives. Verbal consent was given to proceed with the procedure.   • Injection was performed from anteromedial approach.  Patient tolerated the procedure well and no complications were encountered.  • Discussion of orthopedic goals and activities and patient/guardian expressed understanding. I have advised to let me know if he decides he would like to proceed with surgery at any point.  • Ice, heat, rest, compression and elevation of extremity as beneficial  • nsaids and/or tylenol as beneficial  • Instructed to refrain from heavy activity/rest the extremity for the next 24-48 hours  • Discussion regarding possibility of cortisol flare and what to expect if this occurs  • Watch for signs and symptoms of infection  • Call if adverse effect from injection therapy  • Follow up in 3 months      Kamar Jacob PA-C  Encounter Date: 12/21/2020    Electronically signed by Kamar Jacob PA-C, 12/21/20, 2:01 PM EST.       EMR Dragon/Transcription disclaimer:  Much of this encounter note is an electronic transcription/translation of spoken language to printed text. The electronic translation of spoken language may permit  erroneous, or at times, nonsensical words or phrases to be inadvertently transcribed; Although I have reviewed the note for such errors, some may still exist.

## 2021-03-09 DIAGNOSIS — Z23 IMMUNIZATION DUE: ICD-10-CM

## 2021-03-25 ENCOUNTER — HOSPITAL ENCOUNTER (OUTPATIENT)
Dept: OTHER | Facility: HOSPITAL | Age: 71
Discharge: HOME | End: 2021-03-25
Attending: ORTHOPAEDIC SURGERY

## 2021-03-25 ENCOUNTER — OFFICE (OUTPATIENT)
Dept: URBAN - METROPOLITAN AREA CLINIC 75 | Facility: CLINIC | Age: 71
End: 2021-03-25

## 2021-03-25 ENCOUNTER — OFFICE VISIT (OUTPATIENT)
Dept: ORTHOPEDIC SURGERY | Facility: CLINIC | Age: 71
End: 2021-03-25

## 2021-03-25 VITALS
HEIGHT: 70 IN | OXYGEN SATURATION: 95 % | WEIGHT: 174 LBS | SYSTOLIC BLOOD PRESSURE: 138 MMHG | DIASTOLIC BLOOD PRESSURE: 96 MMHG | HEART RATE: 64 BPM | TEMPERATURE: 97 F | RESPIRATION RATE: 12 BRPM

## 2021-03-25 VITALS — HEIGHT: 70 IN | BODY MASS INDEX: 25.05 KG/M2 | TEMPERATURE: 98 F | WEIGHT: 175 LBS

## 2021-03-25 DIAGNOSIS — K21.9 GASTRO-ESOPHAGEAL REFLUX DISEASE WITHOUT ESOPHAGITIS: ICD-10-CM

## 2021-03-25 DIAGNOSIS — M75.42 SUBACROMIAL IMPINGEMENT OF LEFT SHOULDER: ICD-10-CM

## 2021-03-25 DIAGNOSIS — Z96.641 STATUS POST TOTAL HIP REPLACEMENT, RIGHT: Primary | ICD-10-CM

## 2021-03-25 PROCEDURE — 99203 OFFICE O/P NEW LOW 30 MIN: CPT | Performed by: INTERNAL MEDICINE

## 2021-03-25 PROCEDURE — 99213 OFFICE O/P EST LOW 20 MIN: CPT | Performed by: ORTHOPAEDIC SURGERY

## 2021-03-25 PROCEDURE — 20610 DRAIN/INJ JOINT/BURSA W/O US: CPT | Performed by: ORTHOPAEDIC SURGERY

## 2021-03-25 RX ORDER — FAMOTIDINE 40 MG/1
40 TABLET, FILM COATED ORAL
COMMUNITY
Start: 2021-03-13 | End: 2021-04-12

## 2021-03-25 RX ORDER — DEXLANSOPRAZOLE 60 MG/1
60 CAPSULE, DELAYED RELEASE ORAL DAILY
COMMUNITY
Start: 2021-03-13 | End: 2021-04-12

## 2021-03-25 RX ORDER — PANTOPRAZOLE SODIUM 40 MG/1
40 TABLET, DELAYED RELEASE ORAL
Qty: 30 | Refills: 11 | Status: COMPLETED
Start: 2021-03-25 | End: 2022-03-29

## 2021-03-25 RX ADMIN — METHYLPREDNISOLONE ACETATE 160 MG: 80 INJECTION, SUSPENSION INTRA-ARTICULAR; INTRALESIONAL; INTRAMUSCULAR; SOFT TISSUE at 14:12

## 2021-03-25 NOTE — PROGRESS NOTES
Chief Complaint   Patient presents with   • Right Hip - Follow-up, Pain   • Left Shoulder - Follow-up, Pain   • Injections         HPI        Vitals:    03/25/21 1405   Temp: 98 °F (36.7 °C)           Joint/Body Part Specific Exam:        X-RAY REPORT:        There are no diagnoses linked to this encounter.        Procedures        Instructions:      Plan:

## 2021-03-25 NOTE — PROGRESS NOTES
"Chief Complaint  Follow-up and Pain of the Right Hip, Follow-up and Pain of the Left Shoulder, and Injections    Subjective    History of Present Illness      Jose D Bocanegra is a 71 y.o. male who presents to [facility] for follow-up on right total hip arthroplasty performed by me and persistent left shoulder pain and discomfort  History of Present Illness the patient states that he did very well with a right total hip replacement performed by me.  I had done the surgery through an anterior approach on 26 November 2018.  He has recovered full range of motion of the hip.  He is able to walk without the use of a cane.  He is glad that he can walk for exercise to help him lose some weight.  He states my new hip is \"my forgotten joint.  I do not even remember which side I had the surgery\".  There is no limb length discrepancy and there is no neurological deficit.  He states that he does have pain and discomfort in his left shoulder.  There is distinct weakness in abduction and the pain is worst at night.  He has difficulty in reaching into the overhead position.  He has been thinking about getting an MRI to make sure his rotator cuff is functioning well.  Pain Location: LEFT shoulder  Radiation: none  Quality: dull, aching  Intensity/Severity: moderate  Duration: 6 months  Progression of symptoms: yes, progressive worsening  Onset quality: gradual   Timing: intermittent  Aggravating Factors: overhead reaching, reaching backward, reaching forward  Alleviating Factors: NSAIDs  Previous Episodes: yes  Associated Symptoms: pain, decreased strength  ADLs Affected: grooming/hygiene/toileting/personal care  Previous Treatment: NSAIDs       Objective   Vital Signs:   Temp 98 °F (36.7 °C)   Ht 177.8 cm (70\")   Wt 79.4 kg (175 lb)   BMI 25.11 kg/m²     Physical Exam  Physical Exam  Vitals signs and nursing note reviewed.   Constitutional:       Appearance: Normal appearance.   Pulmonary:      " Effort: Pulmonary effort is normal.   Skin:     General: Skin is warm and dry.      Capillary Refill: Capillary refill takes less than 2 seconds.   Neurological:      General: No focal deficit present.      Mental Status: He is alert and oriented to person, place, and time. Mental status is at baseline.   Psychiatric:         Mood and Affect: Mood normal.         Behavior: Behavior normal.         Thought Content: Thought content normal.         Judgment: Judgment normal.     Ortho Exam   Left shoulder (impingement). Patient has signs of impingement with internal and external rotation. There is a lot of pain and tenderness for the patient over the subcromial bursa. Peters's sign is positive. Neer sign is positive. Forward flexion is 0-120 degrees, abduction is 0-112 degrees, external rotation is 0-30 degrees. Rotator cuff function is fairly well preserved except for the impingement at 90 degrees. Apprehension sign is negative. Axillary nerve function is well preserved. Radial artery pulses are palpable. There is no evidence of multidirectional instability. Sulcus sign is negative. Drop arm sign is negative. The patient has some ill-defined tenderness over the greater tuberosity of the humerus. The pain level is 5.    Right hip. Patient is post op 2.5 year(s) from total hip arthoplasty, direct anterior. Incision is clean and healing well. Calf is soft and nontender. Homans sign is negative. Skin and soft tissues are appropriate for postoperative status of the patient. Hip flexion is 0-90 degrees, hip abduction is 0-30 degrees. No limb lenth discrepancy. Neurovascular status is intact. Patients gait is significantly improved. The pain relief is extremely dramatic for the patient. Dorsalis pedis and posterior tibial artery pulses palpable. Common peroneal function is well preserved. There is no evidence of cellulitis or erythema or deep seated joint infection.          Result Review :   The following data was reviewed  by: Brian Francois MD on 03/25/2021:    xrays obtained today  right Hip X-Ray  Indication: Evaluation of implant position after total hip arthroplasty.  AP and lateral  Findings: Well-placed implants with a good press-fit profile without any subsidence.  no bony lesion  Soft tissues within normal limits  within normal limits joint spaces  Hardware appropriately positioned yes      yes prior studies available for comparison.    X-RAY was ordered and reviewed by Brian Francois MD              Large Joint Arthrocentesis: L subacromial bursa  Date/Time: 3/25/2021 2:12 PM  Consent given by: patient  Site marked: site marked  Timeout: Immediately prior to procedure a time out was called to verify the correct patient, procedure, equipment, support staff and site/side marked as required   Supporting Documentation  Indications: pain   Procedure Details  Location: shoulder - L subacromial bursa  Preparation: Patient was prepped and draped in the usual sterile fashion  Needle size: 25 G  Approach: anteromedial  Medications administered: 160 mg methylPREDNISolone acetate 80 MG/ML; 2 mL lidocaine (cardiac)  Patient tolerance: patient tolerated the procedure well with no immediate complications                 Assessment   Assessment and Plan {CC Problem List  Visit Diagnosis  ROS  Review (Popup)  [facility] Maintenance  Quality  BestPractice  Medications  SmartSets  SnapShot Encounters  Media :23}   Diagnoses and all orders for this visit:    1. Status post total hip replacement, right (Primary)    2. Subacromial impingement of left shoulder    Other orders  -     Cancel: Large Joint Arthrocentesis: L glenohumeral  -     Large Joint Arthrocentesis: L subacromial bursa          Follow Up   · Injected patient's left shoulder with a steroid from an anterolateral approach.  · Calcium and vitamin D for bone health.  · Discussed with the patient that he most likely will need an MRI of the shoulder for evaluation of rotator cuff  function and to determine if he requires surgical intervention either in the form of a repair or replacement of the joint.  · Falls precaution and dislocation precautions for the hip arthroplasty discussed with the patient.  · Rest, ice, compression, and elevation (RICE) therapy  · Stretching and strengthening exercises of the flexors and abductors of the shoulder to prevent arthrofibrosis and a frozen shoulder.  · OTC Tylenol 500-1000mg by mouth every 6 hours as needed for pain   · Follow up in 6 month(s)  • Patient was given instructions and counseling regarding his condition or for health maintenance advice. Please see specific information pulled into the AVS if appropriate.     Brian Francois MD   Date of Encounter: 3/25/2021        EMR Dragon/Transcription disclaimer:  Much of this encounter note is an electronic transcription/translation of spoken language to printed text. The electronic translation of spoken language may permit erroneous, or at times, nonsensical words or phrases to be inadvertently transcribed; Although I have reviewed the note for such errors, some may still exist.

## 2021-03-30 RX ORDER — METHYLPREDNISOLONE ACETATE 80 MG/ML
160 INJECTION, SUSPENSION INTRA-ARTICULAR; INTRALESIONAL; INTRAMUSCULAR; SOFT TISSUE
Status: COMPLETED | OUTPATIENT
Start: 2021-03-25 | End: 2021-03-25

## 2021-06-21 ENCOUNTER — TELEPHONE (OUTPATIENT)
Dept: ORTHOPEDIC SURGERY | Facility: CLINIC | Age: 71
End: 2021-06-21

## 2021-06-21 DIAGNOSIS — M25.512 LEFT SHOULDER PAIN, UNSPECIFIED CHRONICITY: Primary | ICD-10-CM

## 2021-06-21 DIAGNOSIS — M75.42 SUBACROMIAL IMPINGEMENT OF LEFT SHOULDER: ICD-10-CM

## 2021-06-21 NOTE — TELEPHONE ENCOUNTER
Caller: MARIANA COTTER    Relationship: WIFE    Best call back number: [phone]    What is the best time to reach you: ANY    What was the call regarding: PT HAS AN APPT WITH [facility] ON 6/28. HE IS HAVING INCREASED PROBLEMS WITH HIS LEFT SHOULDER. THEY WOULD LIKE TO KNOW IF HE CAN GET AN MRI BEFORE HE SEES [facility] NEXT WEEK.    Do you require a callback: YES

## 2021-06-21 NOTE — TELEPHONE ENCOUNTER
I placed an order for MRI but I have put it in to be done downstairs. The last one was at [facility] and I would prefer this one be done here.

## 2021-06-22 NOTE — TELEPHONE ENCOUNTER
I CALLED AND SPOKE WITH PATIENT WIFE MARIANA AND LET HER KNOW [facility] PUT IN THE ORDER FOR THE MRI AND THEY WOULD BE CONTACTING HIM FROM DOWNSTAIRS TO GET THAT SCHEDULED

## 2021-06-30 ENCOUNTER — HOSPITAL ENCOUNTER (OUTPATIENT)
Dept: MRI IMAGING | Facility: HOSPITAL | Age: 71
Discharge: HOME | End: 2021-06-30
Admitting: PHYSICIAN ASSISTANT

## 2021-06-30 DIAGNOSIS — M75.42 SUBACROMIAL IMPINGEMENT OF LEFT SHOULDER: ICD-10-CM

## 2021-06-30 DIAGNOSIS — M25.512 LEFT SHOULDER PAIN, UNSPECIFIED CHRONICITY: ICD-10-CM

## 2021-06-30 PROCEDURE — 73221 MRI JOINT UPR EXTREM W/O DYE: CPT | Performed by: RADIOLOGY

## 2021-06-30 PROCEDURE — 73221 MRI JOINT UPR EXTREM W/O DYE: CPT

## 2021-07-02 ENCOUNTER — CLINICAL SUPPORT (OUTPATIENT)
Dept: ORTHOPEDIC SURGERY | Facility: CLINIC | Age: 71
End: 2021-07-02

## 2021-07-02 VITALS — BODY MASS INDEX: 25.05 KG/M2 | HEIGHT: 70 IN | WEIGHT: 175 LBS | TEMPERATURE: 98.6 F

## 2021-07-02 DIAGNOSIS — S73.192A TEAR OF LEFT ACETABULAR LABRUM, INITIAL ENCOUNTER: ICD-10-CM

## 2021-07-02 DIAGNOSIS — S46.812A FULL THICKNESS TEAR OF LEFT SUBSCAPULARIS TENDON, INITIAL ENCOUNTER: ICD-10-CM

## 2021-07-02 DIAGNOSIS — M75.42 SUBACROMIAL IMPINGEMENT OF LEFT SHOULDER: Primary | ICD-10-CM

## 2021-07-02 PROCEDURE — 99214 OFFICE O/P EST MOD 30 MIN: CPT | Performed by: PHYSICIAN ASSISTANT

## 2021-07-02 PROCEDURE — 20610 DRAIN/INJ JOINT/BURSA W/O US: CPT | Performed by: PHYSICIAN ASSISTANT

## 2021-07-02 RX ORDER — METHYLPREDNISOLONE ACETATE 80 MG/ML
160 INJECTION, SUSPENSION INTRA-ARTICULAR; INTRALESIONAL; INTRAMUSCULAR; SOFT TISSUE
Status: COMPLETED | OUTPATIENT
Start: 2021-07-02 | End: 2021-07-02

## 2021-07-02 RX ADMIN — METHYLPREDNISOLONE ACETATE 160 MG: 80 INJECTION, SUSPENSION INTRA-ARTICULAR; INTRALESIONAL; INTRAMUSCULAR; SOFT TISSUE at 13:33

## 2021-07-02 NOTE — PROGRESS NOTES
"Chief Complaint  Pain and Follow-up of the Left Shoulder and Results (MRI LEFT SHOULDER)    Subjective    History of Present Illness      Jose D Bocanegra is a 71 y.o. male who presents to [facility] for follow up on left shoulder pain and MRI results of left shoulder. He has been receiving long term steroid injections in the shoulder and just recently started to experience more pain. He states the shoulder is not too bad if he does not do much activity but he is having significant discomfort for days when he overuses it. His range of motion is still good but very painful.      Objective   Vital Signs:   Temp 98.6 °F (37 °C)   Ht 177.8 cm (70\")   Wt 79.4 kg (175 lb)   BMI 25.11 kg/m²     Physical Exam  Vitals signs and nursing note reviewed.   Constitutional:       Appearance: Normal appearance.   Pulmonary:      Effort: Pulmonary effort is normal.   Skin:     General: Skin is warm and dry.      Capillary Refill: Capillary refill takes less than 2 seconds.   Neurological:      General: No focal deficit present.      Mental Status: He is alert and oriented to person, place, and time. Mental status is at baseline.   Psychiatric:         Mood and Affect: Mood normal.         Behavior: Behavior normal.         Thought Content: Thought content normal.         Judgment: Judgment normal.     Ortho Exam   LEFT shoulder  Positive for pain and tenderness with palpation over the subcromial bursa.   Positive for pain with palpation over the anterior and posterior aspect of the shoulder.  Positive for signs of impingement with internal and external rotation.   Forward flexion is 0- 120 degrees, abduction is 0-120 degrees, external rotation is 0-40 degrees.   Rotator cuff function is fairly well preserved except impingement at 90 degrees.   Peters's sign is positive. Neer sign is positive.   Sulcus sign is negative. Drop arm sign is negative.   Apprehension sign is negative.   Axillary nerve " function is well preserved.   Radial artery pulses are palpable.   There is no evidence       Result Review :   Radiologic studies - see below for interpretation  Reviewed MRI report of left shoulder, performed at  [facility] on 6/30/21, summary of impression below:  · Moderate AC joint arthritis  · Moderate edema is noted of the acromion and clavicle  · Small amount of fluid of the subdeltoid/subacromial bursa  · Supraspinatus tendon shows small articular surface tear  · Small intrasubstance tear of distal supraspinatus tendon at the footprint  · Mid subscapularis tendon shows an essentially full-thickness tear with approximately 0.4 cm retraction  · Superior and inferior components of subscapularis tendon are intact  · No muscle body atrophy seen  · Infraspinatus shows mild tendinopathy  · Fluid noted in the bicipital groove, long head bicep tendon and glenoid/labral attachment intact  · Tear of transverse ligament/coracohumeral ligament  · Mid posterior labrum tear.  Separation of anterosuperior labrum from the underlying glenoid favored to represent a sublabral foramen  · Tear of inferior labrum.        PROCEDURE  Large Joint Arthrocentesis: L subacromial bursa  Date/Time: 7/2/2021 1:33 PM  Consent given by: patient  Site marked: site marked  Timeout: Immediately prior to procedure a time out was called to verify the correct patient, procedure, equipment, support staff and site/side marked as required   Supporting Documentation  Indications: pain   Procedure Details  Location: shoulder - L subacromial bursa  Preparation: Patient was prepped and draped in the usual sterile fashion  Needle size: 25 G  Approach: anteromedial  Medications administered: 160 mg methylPREDNISolone acetate 80 MG/ML; 2 mL lidocaine (cardiac)  Patient tolerance: patient tolerated the procedure well with no immediate complications          Assessment   Assessment and Plan    Problem List Items Addressed This Visit         Musculoskeletal and Injuries    Subacromial impingement of left shoulder - Primary    Relevant Orders    Large Joint Arthrocentesis: L subacromial bursa (Completed)    [facility]Procedural Request    Urinalysis With Culture If Indicated -    Tear of left acetabular labrum    Relevant Orders    Large Joint Arthrocentesis: L subacromial bursa (Completed)    [facility] Surgical/Procedural Request    Urinalysis With Culture If Indicated -    Full thickness tear of left subscapularis tendon    Relevant Orders    Large Joint Arthrocentesis: L subacromial bursa (Completed)    [facility] Surgical/Procedural Request    Urinalysis With Culture If Indicated -          Follow Up   · Discussion of any imaging in detail. Discussion of orthopaedic goals.  · Risk, benefits, and merits of treatment alternatives reviewed with the patient. Treatment alternatives include: intra-articular steroid injection and surgery  · Ice, heat, and/or modalities as beneficial  · The nature of the proposed surgery reviewed with the patient including risks, benefits, rehabilitation, recovery timeframe, and outcome expectations.  · Risks of minor surgical procedure/intra-articular injection, including but not limited to pain, bleeding, infection into the joint, pigment skin changes related to steroid administration, increased blood sugar levels secondary to steroid administration, scar, recurrence of pain, and tendon rupture associated with steroid administration and possible need for further surgery reviewed with patient. He accepts these risks and wishes to proceed with procedure. Injected patient's left shoulder joint(s) with Depo-Medrol from a(n) anteromedial approach.  Patient tolerated the procedure well and no complications were encountered.   · Patient is encouraged to call or return for any issues or concerns.  · Will discuss surgery with Dr. Francois.  After evaluation in the office,  x-rays and history, we have diagnosed rotator cuff arthropathy of the shoulder.  This is a degenerative condition with a chronic tear of the rotator cuff that is not repairable with loss of cartilage in the glenohumeral joint. The only definitive treatment for this condition is total shoulder arthroplasty or joint replacement.  Conservative measures have been used, including cortisone injections, NSAIDS, activity modification and physical therapy.  This will require 3-4 months of time for recovery. It starts with 4-6 weeks in a sling, followed by rehabilitation at physical therapy and exercises to be done at home.  With this type of implant, you will need to take oral antibiotics prior to all dental visits and for some procedures (for example: colonoscopy, skin lesion removal, etc).  This will be a single dose one hour prior to the procedure.    Risks and benefits of surgery were discussed with the patient in detail.  Risks include but are not limited to infection, myocardial infarction, stroke, DVT, pulmonary embolism, death, dislocation, neurovascular compromise, limb length discrepancy, revision surgery, limited range of motion, wound healing problems and scar tissue build-up.  Patient understands and agrees to proceed with surgery.  • Patient was given instructions and counseling regarding his condition or for health maintenance advice. Please see specific information pulled into the AVS if appropriate.     Kamar Jacob PA-C   Date of Encounter: 7/2/2021   Electronically signed by Kamar Jacob PA-C, 07/02/21, 1:29 PM EDT.     EMR Dragon/Transcription disclaimer:  Much of this encounter note is an electronic transcription/translation of spoken language to printed text. The electronic translation of spoken language may permit erroneous, or at times, nonsensical words or phrases to be inadvertently transcribed; Although I have reviewed the note for such errors, some may still exist.

## 2021-07-07 DIAGNOSIS — M75.42 SUBACROMIAL IMPINGEMENT OF LEFT SHOULDER: Primary | ICD-10-CM

## 2021-07-07 NOTE — PROGRESS NOTES
I do not have an updated xray-MRI shows moderate AC joint arthritis and GH joint cartilage is intact.   If you would like me to have him come for an updated xray I will have him do that.

## 2021-07-08 ENCOUNTER — HOSPITAL ENCOUNTER (OUTPATIENT)
Dept: GENERAL RADIOLOGY | Facility: HOSPITAL | Age: 71
Discharge: HOME | End: 2021-07-08
Admitting: PHYSICIAN ASSISTANT

## 2021-07-08 DIAGNOSIS — M75.42 SUBACROMIAL IMPINGEMENT OF LEFT SHOULDER: ICD-10-CM

## 2021-07-08 PROCEDURE — 73030 X-RAY EXAM OF SHOULDER: CPT | Performed by: RADIOLOGY

## 2021-07-08 PROCEDURE — 73030 X-RAY EXAM OF SHOULDER: CPT

## 2021-07-12 ENCOUNTER — TELEPHONE (OUTPATIENT)
Dept: ORTHOPEDIC SURGERY | Facility: CLINIC | Age: 71
End: 2021-07-12

## 2021-07-12 NOTE — TELEPHONE ENCOUNTER
I spoke to this patient by telephone.  We had a good exchange.  We reviewed his MRI findings.  The patient understands the pathology of rotator cuff tear disease as well as cuff tear arthropathy.  He now wants to proceed with reverse total shoulder arthroplasty in October 2021.  Some risks and benefits of the surgical intervention were discussed with the patient.  We we will have a more extensive discussion at our next meeting.  We will go ahead and start the preoperative process and medical clearance for reverse total shoulder arthroplasty for this patient towards the middle to end of October.  Thank you

## 2021-07-12 NOTE — TELEPHONE ENCOUNTER
----- Message from Miriam Solorio MA sent at 7/12/2021  4:10 PM EDT -----  Don't forget to call this patient per Kamar

## 2021-09-07 ENCOUNTER — TELEPHONE (OUTPATIENT)
Dept: ORTHOPEDIC SURGERY | Facility: CLINIC | Age: 71
End: 2021-09-07

## 2021-09-07 NOTE — TELEPHONE ENCOUNTER
Caller: MARIANA  Relationship to Patient: WIFE    Phone Number: [phone]  Reason for Call: PT CALLED STATING THAT SHE RECEIVED A MESSAGE THAT PT HAS AN APPT WITH [facility] FOR AN INJECTION 9/14/2021 WIFE STATES THAT PT IS HAVING MOLE REMOVAL THAT DAY AS WELL AND WAS HOPING THEY COULD MOVE THE INJECTION TO 9/23/2021 WHEN PT COMES IN TO SEE DR. CALDWELL. PLEASE ADVISE PT AT ABOVE PHONE NUMBER.

## 2021-09-23 ENCOUNTER — OFFICE VISIT (OUTPATIENT)
Dept: ORTHOPEDIC SURGERY | Facility: CLINIC | Age: 71
End: 2021-09-23

## 2021-09-23 VITALS — TEMPERATURE: 97 F | HEIGHT: 70 IN | BODY MASS INDEX: 24.34 KG/M2 | WEIGHT: 170 LBS

## 2021-09-23 DIAGNOSIS — S46.812D FULL THICKNESS TEAR OF LEFT SUBSCAPULARIS TENDON, SUBSEQUENT ENCOUNTER: ICD-10-CM

## 2021-09-23 DIAGNOSIS — Z96.641 STATUS POST TOTAL HIP REPLACEMENT, RIGHT: Primary | ICD-10-CM

## 2021-09-23 PROCEDURE — 99214 OFFICE O/P EST MOD 30 MIN: CPT | Performed by: ORTHOPAEDIC SURGERY

## 2021-09-23 NOTE — PROGRESS NOTES
"Chief Complaint  Follow-up of the Right Hip and Follow-up and Pain of the Left Shoulder    Subjective    History of Present Illness      Jose D Bocanegra is a 71 y.o. male who presents to [facility] for follow-up on right total hip arthroplasty and preoperative discussion for left shoulder cuff tear arthropathy.  History of Present Illness this patient had a right total hip arthroplasty performed by me through an anterior approach.  He had the surgery on 26 November 2018.  He has done extremely well over the past 2-1/2 years with his hip replacement.  He has become very mobile.  He does not have a limb length discrepancy.  He is neurovascularly intact since the time of his surgery.  He is very pleased with his outcome.  He is now here for a preoperative discussion with regards to his left shoulder cuff tear arthropathy.  He has advanced cuff tear disease and wants to proceed with reverse total shoulder arthroplasty.  He has tried every form of conservative, nonoperative care but wants a significant long-term improvement in his quality of life.  Pain Location: RIGHT hip and LEFT shoulder  Radiation: none  Quality: dull, aching  Intensity/Severity: moderate to severe  Duration: 11-12 months  Progression of symptoms: yes, progressive worsening  Onset quality: gradual   Timing: intermittent  Aggravating Factors: overhead reaching, reaching backward, reaching forward, reaching across body, rotating motion  Alleviating Factors: NSAIDs, steroid injection (intra-articular), rest, ice  Previous Episodes: yes  Associated Symptoms: pain, decreased strength  ADLs Affected: grooming/hygiene/toileting/personal care, recreational activities/sports  Previous Treatment: NSAIDs and intra-articular injection       Objective   Vital Signs:   Temp 97 °F (36.1 °C)   Ht 177.8 cm (70\")   Wt 77.1 kg (170 lb)   BMI 24.39 kg/m²     Physical Exam  Physical Exam  Vitals signs and nursing note reviewed. "   Constitutional:       Appearance: Normal appearance.   Pulmonary:      Effort: Pulmonary effort is normal.   Skin:     General: Skin is warm and dry.      Capillary Refill: Capillary refill takes less than 2 seconds.   Neurological:      General: No focal deficit present.      Mental Status: He is alert and oriented to person, place, and time. Mental status is at baseline.   Psychiatric:         Mood and Affect: Mood normal.         Behavior: Behavior normal.         Thought Content: Thought content normal.         Judgment: Judgment normal.     Ortho Exam   Left shoulder (impingement). Patient has signs of impingement with internal and external rotation. There is a lot of pain and tenderness for the patient over the subcromial bursa. Peters's sign is positive. Neer sign is positive. Forward flexion is 0-90 degrees, abduction is 0-90 degrees, external rotation is 0-10 degrees. Rotator cuff function is fairly well preserved except for the impingement at 90 degrees. Apprehension sign is negative. Axillary nerve function is well preserved. Radial artery pulses are palpable. There is no evidence of multidirectional instability. Sulcus sign is negative. Drop arm sign is negative. The patient has some ill-defined tenderness over the greater tuberosity of the humerus. The pain level is 6.    Right hip. Patient is post op 3 year(s) from total hip arthoplasty, direct anterior. Incision is clean and healing well. Calf is soft and nontender. Homans sign is negative. Skin and soft tissues are appropriate for postoperative status of the patient. Hip flexion is 0-90 degrees, hip abduction is 0-30 degrees. No limb lenth discrepancy. Neurovascular status is intact. Patients gait is significantly improved. The pain relief is extremely dramatic for the patient. Dorsalis pedis and posterior tibial artery pulses palpable. Common peroneal function is well preserved. There is no evidence of cellulitis or erythema or deep seated joint  infection.        Result Review :   The following data was reviewed by: Brian Francois MD on 09/23/2021:  Radiologic studies - see below for interpretation  no diagnostic testing performed this visit            Procedures           Assessment   Assessment and Plan    Diagnoses and all orders for this visit:    1. Status post total hip replacement, right (Primary)    2. Full thickness tear of left subscapularis tendon, subsequent encounter          Follow Up   · Rest, ice, compression, and elevation (RICE) therapy  · Stretching and strengthening exercises of the flexors and abductors of the hip.  · Calcium and vitamin D for bone health.  · Falls precaution and dislocation precautions discussed with the patient.  · , GI and dental procedure prophylaxis with antibiotics to prevent metastatic infection to the hip arthroplasty implants.  · Time spent in the office today with the patient is 40 minutes going over treatment options and potential complications of reverse total shoulder arthroplasty including the rationale for surgical intervention.  · OTC Ibuprofen 600mg by mouth every 6-8 hours as needed for pain and swelling  · Patient is scheduled to have left reverse total shoulder arthroplasty on 10/25/2021 at [facility]  • Patient was given instructions and counseling regarding his condition or for health maintenance advice. Please see specific information pulled into the AVS if appropriate.   After evaluation in the office, x-rays and history, we have diagnosed rotator cuff arthropathy of the shoulder.  This is a degenerative condition with a chronic tear of the rotator cuff that is not repairable with loss of cartilage in the glenohumeral joint. The only definitive treatment for this condition is total shoulder arthroplasty or joint replacement.  Conservative measures have been used, including cortisone injections, NSAIDS, activity modification and physical therapy.  This will require 3-4 months of time for recovery. It  starts with 4-6 weeks in a sling, followed by rehabilitation at physical therapy and exercises to be done at home.  With this type of implant, you will need to take oral antibiotics prior to all dental visits and for some procedures (for example: colonoscopy, skin lesion removal, etc).  This will be a single dose one hour prior to the procedure.      Risks and benefits of surgery were discussed with the patient in detail.  Risks include but are not limited to infection, myocardial infarction, stroke, DVT, pulmonary embolism, death, dislocation, neurovascular compromise, limb length discrepancy, revision surgery, limited range of motion, wound healing problems and scar tissue build-up.  Patient understands and agrees to proceed with surgery.  •     Brian Francois MD   Date of Encounter: 9/23/2021       EMR Dragon/Transcription disclaimer:  Much of this encounter note is an electronic transcription/translation of spoken language to printed text. The electronic translation of spoken language may permit erroneous, or at times, nonsensical words or phrases to be inadvertently transcribed; Although I have reviewed the note for such errors, some may still exist.

## 2021-10-22 ENCOUNTER — TELEPHONE (OUTPATIENT)
Dept: ORTHOPEDIC SURGERY | Facility: CLINIC | Age: 71
End: 2021-10-22

## 2021-10-22 NOTE — TELEPHONE ENCOUNTER
IWONA WITH [employer] CALLED TO SEE IF WE HAD PRIOR AUTHORIZATION FOR PATIENT'S SURGERY.  PATIENT HAS [facility] MEDICARE REPLACEMENT THAT WAS EFFECTIVE October 1, 2021.  THE INSURANCE WE HAVE ON FILE FOR PATIENT IS MEDICARE.    I CALLED PATIENT TO LET HIM KNOW THAT HIS SURGERY IS CANCELED FOR Monday BECAUSE IT REQUIRES PRIOR AUTHORIZATION AND WE WERE UNAWARE THAT HE HAS NEW INSURANCE.  HE WAS ADVISED TO BRING A COPY OF HIS CARD AS SOON AS POSSIBLE SO THAT WE CAN OBTAIN PRIOR AUTHORIZATION AND GET HIS SURGERY RESCHEDULED.    I CALLED PHIL AND SPOKE WITH ALONZO TO LET HER KNOW THAT PATIENT'S SURGERY IS CANCELED FOR Monday 10/25/21.

## 2021-10-22 NOTE — TELEPHONE ENCOUNTER
Provider: DR. CALDWELL  Caller: MARIANA COTTER  Relationship to Patient: WIFE     Phone Number: [phone]  Reason for Call: SX QUESTIONS  When was the patient last seen: 9-23-21    PATIENT'S WIFE WOULD LIKE A CALL BACK TO DISCUSS PATIENT'S LEFT SHOULDER SURGERY THAT WAS CANCELLED (10-25-21)

## 2021-10-25 ENCOUNTER — TELEPHONE (OUTPATIENT)
Dept: ORTHOPEDIC SURGERY | Facility: CLINIC | Age: 71
End: 2021-10-25

## 2021-10-25 NOTE — TELEPHONE ENCOUNTER
CALLED AND LEFT MESSAGE THAT I HAD STARTED THE PRE-CERTIFICATION PROCESS WITH [employer] MEDICARE AND HAVE RECEIVED  A PENDING AUTH NUMBER. I SPOKE WITH RAMÓN [facility]  AND SHE STATED THAT IT COULD TAKE 5-14 DAYS FOR THE PRE-CERT PROCESS. I LEFT ON THE MESSAGE THAT I WOULD LET MR COTTER KNOW ONCE WE HAD RECEIVED THE AUTH./AW

## 2021-11-15 ENCOUNTER — TELEPHONE (OUTPATIENT)
Dept: ORTHOPEDIC SURGERY | Facility: CLINIC | Age: 71
End: 2021-11-15

## 2021-11-15 ENCOUNTER — OUTSIDE FACILITY SERVICE (OUTPATIENT)
Dept: ORTHOPEDIC SURGERY | Facility: CLINIC | Age: 71
End: 2021-11-15

## 2021-11-15 ENCOUNTER — HOSPITAL ENCOUNTER (OUTPATIENT)
Dept: HOSPITAL 49 - FAS | Age: 71
LOS: 1 days | Discharge: HOME | End: 2021-11-16
Attending: ORTHOPAEDIC SURGERY
Payer: MEDICARE

## 2021-11-15 VITALS — WEIGHT: 174.17 LBS | BODY MASS INDEX: 24.93 KG/M2 | HEIGHT: 70.08 IN

## 2021-11-15 DIAGNOSIS — K21.9: ICD-10-CM

## 2021-11-15 DIAGNOSIS — S43.492A: ICD-10-CM
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
## 2021-11-15 DIAGNOSIS — M75.42: ICD-10-CM

## 2021-11-15 DIAGNOSIS — S43.012A: ICD-10-CM

## 2021-11-15 DIAGNOSIS — G47.33: ICD-10-CM

## 2021-11-15 DIAGNOSIS — M75.122: Primary | ICD-10-CM

## 2021-11-15 DIAGNOSIS — E78.5: ICD-10-CM
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
## 2021-11-15 DIAGNOSIS — Z20.822: ICD-10-CM

## 2021-11-15 DIAGNOSIS — M19.012: ICD-10-CM

## 2021-11-15 LAB
ALBUMIN SERPL-MCNC: 3.8 G/DL (ref 3.4–5)
ALKALINE PHOSHATASE: 82 U/L (ref 46–116)
ALT SERPL-CCNC: 38 U/L (ref 16–63)
AST: 25 U/L (ref 15–37)
BILIRUBIN - TOTAL: 0.3 MG/DL (ref 0.2–1)
BILIRUBIN: NEGATIVE MG/DL
BLOOD: NEGATIVE ERY/UL
BUN SERPL-MCNC: 16 MG/DL (ref 7–18)
BUN/CREAT RATIO (CALC): 15.5 RATIO
CHLORIDE: 104 MMOL/L (ref 98–107)
CLARITY UR: CLEAR
CO2 (BICARBONATE): 27 MMOL/L (ref 21–32)
COLOR: YELLOW
CREATININE: 1.03 MG/DL (ref 0.67–1.17)
GLOBULIN (CALCULATION): 4.2 G/DL
GLUCOSE (U): NORMAL MG/DL
GLUCOSE SERPL-MCNC: 98 MG/DL (ref 74–106)
HCT: 45.7 % (ref 42–52)
HGB BLD-MCNC: 14.7 G/DL (ref 13.2–18)
INR PPP: 1.09 (ref 0.9–1.2)
LEUKOCYTES: NEGATIVE LEU/UL
MCH RBC QN AUTO: 30 PG (ref 25–31)
MCHC RBC AUTO-ENTMCNC: 32.2 G/DL (ref 32–36)
MCV: 93.3 FL (ref 78–100)
MPV: 9.6 FL (ref 6–9.5)
NITRITE: NEGATIVE MG/DL
PLT: 259 K/UL (ref 150–400)
POTASSIUM: 3.9 MMOL/L (ref 3.5–5.1)
PROTEIN: NEGATIVE MG/DL
PROTHROMBIN TIME: 13.5 SECONDS (ref 11.8–13.4)
PTT: 34.8 SECONDS (ref 24.4–34.7)
RBC: 4.9 M/UL (ref 4.7–6)
RDW: 12 % (ref 11.5–14)
SPECIFIC GRAVITY: >=1.03 (ref 1–1.03)
TOTAL PROTEIN: 8 G/DL (ref 6.4–8.2)
UROBILINOGEN: 0.2 MG/DL (ref 0.2–1)
WBC: 5.8 K/UL (ref 4–10.5)

## 2021-11-15 PROCEDURE — U0002 COVID-19 LAB TEST NON-CDC: HCPCS

## 2021-11-15 PROCEDURE — C1776 JOINT DEVICE (IMPLANTABLE): HCPCS

## 2021-11-15 PROCEDURE — C1713 ANCHOR/SCREW BN/BN,TIS/BN: HCPCS

## 2021-11-15 PROCEDURE — 23472 RECONSTRUCT SHOULDER JOINT: CPT | Performed by: ORTHOPAEDIC SURGERY

## 2021-11-16 LAB
BASOPHIL: 0.7 % (ref 0–2)
BUN SERPL-MCNC: 16 MG/DL (ref 7–18)
BUN/CREAT RATIO (CALC): 17 RATIO
CHLORIDE: 103 MMOL/L (ref 98–107)
CO2 (BICARBONATE): 23 MMOL/L (ref 21–32)
CREATININE: 0.94 MG/DL (ref 0.67–1.17)
EOSINOPHIL: 6.4 % (ref 0–7)
GLUCOSE SERPL-MCNC: 114 MG/DL (ref 74–106)
HCT: 36 % (ref 42–52)
HGB BLD-MCNC: 11.9 G/DL (ref 13.2–18)
LYMPHOCYTE: 10.3 % (ref 15–48)
MCH RBC QN AUTO: 30.7 PG (ref 25–31)
MCHC RBC AUTO-ENTMCNC: 33.1 G/DL (ref 32–36)
MCV: 92.8 FL (ref 78–100)
MONOCYTE: 14.7 % (ref 0–12)
MPV: 9.6 FL (ref 6–9.5)
NEUTROPHIL: 67.6 % (ref 41–80)
NRBC: 0
PLT: 184 K/UL (ref 150–400)
POTASSIUM: 4.3 MMOL/L (ref 3.5–5.1)
RBC MORPHOLOGY: NORMAL
RBC: 3.88 M/UL (ref 4.7–6)
RDW: 12.1 % (ref 11.5–14)
WBC: 6.1 K/UL (ref 4–10.5)

## 2021-11-22 DIAGNOSIS — Z96.612 S/P REVERSE TOTAL SHOULDER ARTHROPLASTY, LEFT: Primary | ICD-10-CM

## 2021-11-29 ENCOUNTER — OFFICE VISIT (OUTPATIENT)
Dept: ORTHOPEDIC SURGERY | Facility: CLINIC | Age: 71
End: 2021-11-29

## 2021-11-29 ENCOUNTER — HOSPITAL ENCOUNTER (OUTPATIENT)
Dept: GENERAL RADIOLOGY | Facility: HOSPITAL | Age: 71
Discharge: HOME | End: 2021-11-29
Admitting: PHYSICIAN ASSISTANT

## 2021-11-29 VITALS — BODY MASS INDEX: 24.34 KG/M2 | TEMPERATURE: 98 F | HEIGHT: 70 IN | WEIGHT: 170 LBS

## 2021-11-29 DIAGNOSIS — Z96.612 S/P REVERSE TOTAL SHOULDER ARTHROPLASTY, LEFT: Primary | ICD-10-CM

## 2021-11-29 DIAGNOSIS — Z96.612 S/P REVERSE TOTAL SHOULDER ARTHROPLASTY, LEFT: ICD-10-CM

## 2021-11-29 PROCEDURE — 99024 POSTOP FOLLOW-UP VISIT: CPT | Performed by: PHYSICIAN ASSISTANT

## 2021-11-29 PROCEDURE — 73030 X-RAY EXAM OF SHOULDER: CPT

## 2021-11-29 RX ORDER — FAMOTIDINE 40 MG/1
TABLET, FILM COATED ORAL
COMMUNITY
Start: 2021-10-22

## 2021-11-29 NOTE — PATIENT INSTRUCTIONS
Post-operative Phase 1 (Weeks 0-6):    Goals:   1.) Ensure wound healing  2.) Protect the repair  3.) Decrease pain     Brace Instructions:  · Wear sling/pillow at all times except when showering or performing pendulum exercises.  · You should remove the sling and perform pendulum exercises 5 or 6 times per day for 5-10 minutes at a time.  · For the first 6 weeks, when lying supine, please place a pillow or a rolled towel behind her arm to limit extension, which places stress on the tissues on the front of your shoulder.  · A good rule of thumb is that you should always be able to visualize your elbow when lying down.    Weightbearing (WB) status:  No lifting of objects heavier than a coffee cup.  No shoulder extension or sudden reaching.  No shoulder motion behind the back.    Range of motion (ROM):  NO ACTIVE ROM OF THE SHOULDER.  Full active ROM of elbow, wrist and hand.  No shoulder extension  Icing of the shoulder  For exercise may use stationary bike, elliptical machine, stair stepper          Post-operative Phase 2 (Weeks 7-8):    Goals:   1.)  Protect the shoulder arthroplasty  2.)  Prevent shoulder stiffness  3.)  Improve range of motion  4.)  Begin strengthening    Brace Instructions:  · Gradually discontinue use of the sling/pillow    Weightbearing (WB) status:  · No lifting of objects heavier than a coffee cup  · No excessive stretching or sudden movements  · He may begin to use the arm for routine daily activities such as feeding, dressing, bathing and personal hygiene  · Continue to avoid shoulder extension  · You may raise the arm away from the body, but not while carrying anything heavier than a coffee cup  · No forceful pushing or pulling  · No supporting her body weight with your hands    Range of motion (ROM):  · Continue previous range of motion exercises  · Advance active assisted range of motion as tolerated.  · Progress from supine to sitting/standing  · Continue to avoid shoulder extension,  especially in abduction and internal rotation  · Begin passive internal rotation in the scapular plane, but restrict to less than 45 degrees          Post-operative Phase 3 (Weeks 9-12):    Goals:   1.)  Protect the shoulder arthroplasty  2.)  Continue ROM gains  3.)  Advanced strengthening  4.)  Increase functional activities    Brace Instructions:  · None    Weightbearing (WB) status:  · No heavy lifting or manual labor  · No shoulder extension, excessive stretching or sudden movements  · No supporting body weight with the hands    Range of motion (ROM):  · Continue previous passive, active assisted range of motion  · Advance to active range of motion supine, forward flexion and elevation in the plane of the scapula  · Continue to avoid shoulder extension          Post-operative Phase 4 (Weeks 13-18):    Goals:   1.)  Protect the shoulder arthroplasty  2.)  Continue gentle strengthening  3.)  Add functional activities  4.)  Improve neuromuscular control    Brace Instructions:  · None    Weightbearing (WB) status:  · No forceful pushing or pulling  · No lifting greater than 5 pounds with the operative arm  · No supporting body weight with the hands    Range of motion (ROM):  · Continue full motion with light stretching at extremes

## 2021-11-29 NOTE — PROGRESS NOTES
POST OPERATIVE FOLLOW UP (Global)      NAME: Jose D Bocanegra           : 1950    MRN: [id number]      Chief Complaint   Patient presents with   • Left Shoulder - Follow-up, Pain   • Post-op     LEFT REVERSE TOTAL SHOULDER 11/15/21     Date of Surgery: 11/15/21  ?     HPI  Jose D Bocanegra presents for 2 week postoperative follow up s/p left reverse total shoulder arthroplasty performed by Brian Francois MD. The patient has had a relatively normal postoperative course.  The patient has had improving normal postoperative pain.  The patient has had no issues with the wound.         Physical Exam  General: alert, appears stated age, cooperative and no distress  Incision/Skin: healing well, no drainage, no erythema, no seroma, no swelling, incision well approximated  Musculoskeletal:   LEFT shoulder  He is afebrile  There is no glenohumeral instability. No clicking, popping or catching is noted.   Axillary nerve function is well preserved.   Radial artery pulses are palpable.   Range of motion: passive flexion 30 degrees, passive abduction 30 degrees.      Diagnostic Data:  LEFT shoulder xrays 4 views were ordered by Kamar Jacob PA-C. Performed at [facility] on 2021. Images were independently viewed and interpreted by myself, my impression as follows:  Findings: expected postoperative appearance  Bony Lesion: No  Soft tissues: within normal limits  Joint spaces: WNL  Hardware appropriately positioned: yes  Prior studies available for comparison: yes          Assessment/Plan   Assessment:    1. S/P reverse total shoulder arthroplasty, left          Plan:   Orders Placed This Encounter   Procedures   • Ambulatory Referral to Physical Therapy Evaluate and treat (s/p left rev tsa), POST OP      • Wound care instructions given  • Ice and/or modalities as beneficial  • Watch for signs and symptoms of infection  • Physical therapy referral given  • Weight bearing parameters  reviewed  • Take prescribed medications as instructed, but only as tolerated  • Aftercare and dental prophylaxis for joint replacement surgery.  • Discussion of orthopaedic goals and activities and patient and/or guardian expressed appreciation.  • Follow up in 4-6 weeks         Patient Instructions   Post-operative Phase 1 (Weeks 0-6):    Goals:   1.) Ensure wound healing  2.) Protect the repair  3.) Decrease pain     Brace Instructions:  · Wear sling/pillow at all times except when showering or performing pendulum exercises.  · You should remove the sling and perform pendulum exercises 5 or 6 times per day for 5-10 minutes at a time.  · For the first 6 weeks, when lying supine, please place a pillow or a rolled towel behind her arm to limit extension, which places stress on the tissues on the front of your shoulder.  · A good rule of thumb is that you should always be able to visualize your elbow when lying down.    Weightbearing (WB) status:  No lifting of objects heavier than a coffee cup.  No shoulder extension or sudden reaching.  No shoulder motion behind the back.    Range of motion (ROM):  NO ACTIVE ROM OF THE SHOULDER.  Full active ROM of elbow, wrist and hand.  No shoulder extension  Icing of the shoulder  For exercise may use stationary bike, elliptical machine, stair stepper          Post-operative Phase 2 (Weeks 7-8):    Goals:   1.)  Protect the shoulder arthroplasty  2.)  Prevent shoulder stiffness  3.)  Improve range of motion  4.)  Begin strengthening    Brace Instructions:  · Gradually discontinue use of the sling/pillow    Weightbearing (WB) status:  · No lifting of objects heavier than a coffee cup  · No excessive stretching or sudden movements  · He may begin to use the arm for routine daily activities such as feeding, dressing, bathing and personal hygiene  · Continue to avoid shoulder extension  · You may raise the arm away from the body, but not while carrying anything heavier than a coffee  cup  · No forceful pushing or pulling  · No supporting her body weight with your hands    Range of motion (ROM):  · Continue previous range of motion exercises  · Advance active assisted range of motion as tolerated.  · Progress from supine to sitting/standing  · Continue to avoid shoulder extension, especially in abduction and internal rotation  · Begin passive internal rotation in the scapular plane, but restrict to less than 45 degrees          Post-operative Phase 3 (Weeks 9-12):    Goals:   1.)  Protect the shoulder arthroplasty  2.)  Continue ROM gains  3.)  Advanced strengthening  4.)  Increase functional activities    Brace Instructions:  · None    Weightbearing (WB) status:  · No heavy lifting or manual labor  · No shoulder extension, excessive stretching or sudden movements  · No supporting body weight with the hands    Range of motion (ROM):  · Continue previous passive, active assisted range of motion  · Advance to active range of motion supine, forward flexion and elevation in the plane of the scapula  · Continue to avoid shoulder extension          Post-operative Phase 4 (Weeks 13-18):    Goals:   1.)  Protect the shoulder arthroplasty  2.)  Continue gentle strengthening  3.)  Add functional activities  4.)  Improve neuromuscular control    Brace Instructions:  · None    Weightbearing (WB) status:  · No forceful pushing or pulling  · No lifting greater than 5 pounds with the operative arm  · No supporting body weight with the hands    Range of motion (ROM):  · Continue full motion with light stretching at extremes           Kamar Jacob PA-C  11/29/2021     Electronically signed by Kamar Jacob PA-C, 11/29/21, 12:41 PM EST.    EMR Dragon/Transcription disclaimer:  Much of this encounter note is an electronic transcription/translation of spoken language to printed text. The electronic translation of spoken language may permit erroneous, or at times, nonsensical words or phrases to be  inadvertently transcribed; Although I have reviewed the note for such errors, some may still exist.

## 2021-12-15 ENCOUNTER — OFFICE VISIT (OUTPATIENT)
Dept: ORTHOPEDIC SURGERY | Facility: CLINIC | Age: 71
End: 2021-12-15

## 2021-12-15 VITALS — BODY MASS INDEX: 24.34 KG/M2 | TEMPERATURE: 98.3 F | HEIGHT: 70 IN | WEIGHT: 170 LBS

## 2021-12-15 DIAGNOSIS — Z96.612 S/P REVERSE TOTAL SHOULDER ARTHROPLASTY, LEFT: Primary | ICD-10-CM

## 2021-12-15 DIAGNOSIS — R22.9 LOCALIZED SOFT TISSUE SWELLING: ICD-10-CM

## 2021-12-15 PROCEDURE — 99024 POSTOP FOLLOW-UP VISIT: CPT | Performed by: PHYSICIAN ASSISTANT

## 2021-12-15 NOTE — PROGRESS NOTES
POST OPERATIVE FOLLOW UP (Global)      NAME: Jose D Bocanegra           : 1950    MRN: [id number]      Chief Complaint   Patient presents with   • Left Shoulder - Pain, Post-op     Date of Surgery: 11/15/21  ?     HPI  Jose D Bocanegra presents for 4 week postoperative follow up s/p left reverse total shoulder arthroplasty performed by Brian Francois MD. The patient has had a relatively normal postoperative course.  The patient has had improving normal postoperative pain.  The patient has had no issues with the wound.  He returns today for evaluation of the arm.  He states the physical therapist wanted him to have the arm checked due to an area of swelling in the bicep region.  He denies any pain and states that it has actually improved in size over the last few days.  He does have residual bruising.  Denies any recent injury.        Physical Exam  General: alert, appears stated age, cooperative and no distress  Incision/Skin: healing well, no drainage, no erythema, no seroma, no swelling, incision well approximated  Musculoskeletal:   LEFT shoulder  He is afebrile  There is no glenohumeral instability. No clicking, popping or catching is noted.   Axillary nerve function is well preserved.   Radial artery pulses are palpable.   Range of motion: passive flexion 30 degrees, passive abduction 30 degrees.  There is old bruising present over the left bicep, distally.  He also has a slight bulge over the bicep that could indicate a bicep tendon tear or blood collection.  There is no swelling of the arm/no indication of DVT.       Assessment/Plan   Assessment:    1. S/P reverse total shoulder arthroplasty, left    2. Localized soft tissue swelling          Plan:       • Wound care instructions given  • Ice and/or modalities as beneficial  • Watch for signs and symptoms of infection  • Physical therapy referral given.  Pillow removed from sling.  Advised he can begin weaning out of the sling over the next 2  weeks.  • Weight bearing parameters reviewed  • Take prescribed medications as instructed, but only as tolerated  • Aftercare and dental prophylaxis for joint replacement surgery.  • Discussion of orthopaedic goals and activities and patient and/or guardian expressed appreciation.  • Reassurance regarding left bicep region.  Discussed that if it were a bicep tendon tear most time there is not much we will do for those.  My recommendation would be to see how it does over the next few weeks and will have Dr. Francois reevaluate at next visit.  • Follow up as scheduled with Dr. Francois         There are no Patient Instructions on file for this visit.       Kamar Jacob PA-C  12/15/2021   Electronically signed by Kamar Jacob PA-C, 12/15/21, 11:34 AM EST.        EMR Dragon/Transcription disclaimer:  Much of this encounter note is an electronic transcription/translation of spoken language to printed text. The electronic translation of spoken language may permit erroneous, or at times, nonsensical words or phrases to be inadvertently transcribed; Although I have reviewed the note for such errors, some may still exist.

## 2021-12-30 ENCOUNTER — TELEPHONE (OUTPATIENT)
Dept: ORTHOPEDIC SURGERY | Facility: CLINIC | Age: 71
End: 2021-12-30

## 2021-12-30 RX ORDER — SULFAMETHOXAZOLE AND TRIMETHOPRIM 800; 160 MG/1; MG/1
1 TABLET ORAL 2 TIMES DAILY
Qty: 14 TABLET | Refills: 0 | Status: SHIPPED | OUTPATIENT
Start: 2021-12-30

## 2021-12-30 RX ORDER — DOXYCYCLINE HYCLATE 100 MG/1
100 CAPSULE ORAL 2 TIMES DAILY
Qty: 14 CAPSULE | Refills: 0 | Status: SHIPPED | OUTPATIENT
Start: 2021-12-30

## 2022-01-04 ENCOUNTER — OFFICE VISIT (OUTPATIENT)
Dept: ORTHOPEDIC SURGERY | Facility: CLINIC | Age: 72
End: 2022-01-04

## 2022-01-04 VITALS — TEMPERATURE: 97.3 F | HEIGHT: 70 IN | WEIGHT: 170 LBS | BODY MASS INDEX: 24.34 KG/M2

## 2022-01-04 DIAGNOSIS — Z96.612 S/P REVERSE TOTAL SHOULDER ARTHROPLASTY, LEFT: Primary | ICD-10-CM

## 2022-01-04 PROCEDURE — 99024 POSTOP FOLLOW-UP VISIT: CPT | Performed by: ORTHOPAEDIC SURGERY

## 2022-01-04 NOTE — PROGRESS NOTES
POST OP TOTAL GLOBAL      NAME: Jose D Bocanegra           : 1950    MRN: [id number]    Chief Complaint   Patient presents with   • Left Shoulder - Follow-up, Pain   • Post-op     LEFT SHOULDER ARTHOPLASTY 11/15/21   • Wound Check       Date of Surgery: 11/15/2021  ?  HPI:   Patient returns today for 8 week follow up of left reverse total shoulder arthroplasty Incision(s) healing nicely/as expected with no signs of infection. Patient reports doing well with no unusual complaints. No fevers, rigors or chills. Appears to be progressing appropriately. Patient is on appropriate anticoagulation.  He has done extremely well postoperatively and is using minimum pain medication for symptom control at this point.      Ortho Exam:   Left shoulder. Patient is postoperative 8 week(s). Afebrile. Vital signs are stable. Deltopectoral incision is clean and healing well. Axillary nerve function is well preserved. There is no glenohumeral instability. No clicking, popping or catching is noted. Apprehension sign is negative. Axillary nerve function is well preserved. Radial artery pulses are palpable. There is no clinical deformity. Range of motion is flexion 0-90 degrees, abduction 0-90 degrees.      Diagnostic Studies:  no diagnostic testing performed this visit        Assessment:  Diagnoses and all orders for this visit:    1. S/P reverse total shoulder arthroplasty, left (Primary)            Plan   · Incision care  · To use ACE wrap/GARRISON stocking  · Continue ice to joint   · Stretching and strengthening exercises of the flexors and abductors of the shoulder following the reverse total shoulder arthroplasty protocols.  · Calcium and vitamin D for bone health.  · Gentle active ROM  · Falls precautions and dislocation precautions.  · Once Xarelto is completed, change to an enteric coated Aspirin 325 mg daily until 6 weeks following your surgery  · Continue with lifelong antibiotic prophylaxis with dental procedures following  total joint replacement.  · Follow up in 6 week(s)    Date of encounter: 01/04/2022   Brian Francois MD

## 2022-02-09 DIAGNOSIS — Z96.612 S/P REVERSE TOTAL SHOULDER ARTHROPLASTY, LEFT: Primary | ICD-10-CM

## 2022-02-10 ENCOUNTER — OFFICE VISIT (OUTPATIENT)
Dept: ORTHOPEDIC SURGERY | Facility: CLINIC | Age: 72
End: 2022-02-10

## 2022-02-10 ENCOUNTER — HOSPITAL ENCOUNTER (OUTPATIENT)
Dept: GENERAL RADIOLOGY | Facility: HOSPITAL | Age: 72
Discharge: HOME | End: 2022-02-10
Admitting: ORTHOPAEDIC SURGERY

## 2022-02-10 VITALS — HEIGHT: 70 IN | BODY MASS INDEX: 24.34 KG/M2 | WEIGHT: 170 LBS | TEMPERATURE: 98.5 F

## 2022-02-10 DIAGNOSIS — Z96.612 S/P REVERSE TOTAL SHOULDER ARTHROPLASTY, LEFT: Primary | ICD-10-CM

## 2022-02-10 DIAGNOSIS — Z96.612 S/P REVERSE TOTAL SHOULDER ARTHROPLASTY, LEFT: ICD-10-CM

## 2022-02-10 PROCEDURE — 73030 X-RAY EXAM OF SHOULDER: CPT

## 2022-02-10 PROCEDURE — 99024 POSTOP FOLLOW-UP VISIT: CPT | Performed by: ORTHOPAEDIC SURGERY

## 2022-02-10 NOTE — PROGRESS NOTES
POST OP TOTAL GLOBAL      NAME: Jose D Bocanegra           : 1950    MRN: [id number]    Chief Complaint   Patient presents with   • Left Shoulder - Pain, Post-op       Date of Surgery: 11/15/21  ?  HPI:   Patient returns today for 8 week follow up of left reverse total shoulder arthroplasty Incision(s) healed nicely with no signs of infection. Patient reports doing well with no unusual complaints. No fevers, rigors or chills. Appears to be progressing appropriately. Patient is on appropriate anticoagulation.  He has some amount of soreness over the distal biceps and the proximal part of the humerus.  I have reassured the patient that this is completely normal after major joint replacement surgery of the upper extremity.      Ortho Exam:   Left shoulder. Patient is postoperative 8 week(s). Afebrile. Vital signs are stable. Deltopectoral incision is clean and healing well. Axillary nerve function is well preserved. There is no glenohumeral instability. No clicking, popping or catching is noted. Apprehension sign is negative. Axillary nerve function is well preserved. Radial artery pulses are palpable. There is no clinical deformity. Range of motion is flexion 0-90 degrees, abduction 0-90 degrees.      Diagnostic Studies:  LEFT shoulder xrays  weightbearing/standing AP/LATERAL views were ordered by Brian Francois MD. Performed at [facility] on 02/10/2022. Images were independently viewed and interpreted by myself, my impression as follows:    left Shoulder X-Ray  Indication: Evaluation of implant position after reverse total shoulder arthroplasty.  AP and Lateral  Findings: Excelling position of the implants with a good press-fit profile without any periprosthetic fractures noted.  no bony lesion  Soft tissues within normal limits  within normal limits joint spaces  Hardware appropriately positioned yes      yes prior studies available for comparison.    X-RAY was ordered and reviewed by  Brian Francois MD        Assessment:  Diagnoses and all orders for this visit:    1. S/P reverse total shoulder arthroplasty, left (Primary)            Plan   ·   · To use ACE wrap/GARRISON stocking  · Continue ice to joint   · Stretching and strengthening exercises  · Aggressive ROM  · Falls precautions  · You may now resume any prescription medications you were taking prior to surgery  · Continue with lifelong antibiotic prophylaxis with dental procedures following total joint replacement.  · Follow up in 8 week(s)    Date of encounter: 02/10/2022   Brian Francois MD    Answers for HPI/ROS submitted by the patient on 2/3/2022  What is the primary reason for your visit?: Other  Please describe your symptoms.: Post op for shoulder replacement  Have you had these symptoms before?: No  How long have you been having these symptoms?: 1-4 days  Please list any medications you are currently taking for this condition.: None  Please describe any probable cause for these symptoms. : None

## 2022-03-29 ENCOUNTER — OFFICE (OUTPATIENT)
Dept: URBAN - METROPOLITAN AREA CLINIC 75 | Facility: CLINIC | Age: 72
End: 2022-03-29

## 2022-03-29 VITALS
HEART RATE: 72 BPM | HEIGHT: 70 IN | SYSTOLIC BLOOD PRESSURE: 140 MMHG | DIASTOLIC BLOOD PRESSURE: 81 MMHG | OXYGEN SATURATION: 98 % | WEIGHT: 171 LBS

## 2022-03-29 DIAGNOSIS — Z86.010 PERSONAL HISTORY OF COLONIC POLYPS: ICD-10-CM

## 2022-03-29 DIAGNOSIS — K21.9 GASTRO-ESOPHAGEAL REFLUX DISEASE WITHOUT ESOPHAGITIS: ICD-10-CM

## 2022-03-29 DIAGNOSIS — R13.10 DYSPHAGIA, UNSPECIFIED: ICD-10-CM

## 2022-03-29 PROCEDURE — 99214 OFFICE O/P EST MOD 30 MIN: CPT | Performed by: NURSE PRACTITIONER

## 2022-03-29 RX ORDER — DEXLANSOPRAZOLE 60 MG/1
60 CAPSULE, DELAYED RELEASE ORAL
Qty: 30 | Refills: 6 | Status: COMPLETED
Start: 2022-03-29 | End: 2022-03-31

## 2022-03-29 RX ORDER — PANTOPRAZOLE SODIUM 40 MG/1
40 TABLET, DELAYED RELEASE ORAL
Qty: 30 | Refills: 11 | Status: COMPLETED
Start: 2021-03-25 | End: 2022-03-29

## 2022-04-14 ENCOUNTER — OFFICE VISIT (OUTPATIENT)
Dept: ORTHOPEDIC SURGERY | Facility: CLINIC | Age: 72
End: 2022-04-14

## 2022-04-14 VITALS — HEIGHT: 70 IN | TEMPERATURE: 98.6 F | BODY MASS INDEX: 24.34 KG/M2 | WEIGHT: 170 LBS

## 2022-04-14 DIAGNOSIS — Z96.641 STATUS POST TOTAL HIP REPLACEMENT, RIGHT: ICD-10-CM

## 2022-04-14 DIAGNOSIS — Z96.612 S/P REVERSE TOTAL SHOULDER ARTHROPLASTY, LEFT: Primary | ICD-10-CM

## 2022-04-14 PROCEDURE — 99213 OFFICE O/P EST LOW 20 MIN: CPT | Performed by: ORTHOPAEDIC SURGERY

## 2022-04-14 NOTE — PROGRESS NOTES
"Chief Complaint  Follow-up of the Left Shoulder    Subjective    History of Present Illness      Jose D Bocanegra is a 72 y.o. male who presents to [facility] for follow-up on right total hip arthroplasty and left reverse total shoulder arthroplasty.  History of Present Illness this patient is well-known to me having undergone a right hip replacement performed by me 4 years ago.  We did the surgery through direct anterior approach.  He has done extremely well postoperatively.  He does not have a limb length discrepancy.  His range of motion is near normal and he is neurovascularly intact.  In fact, the patient states that he does not take any pain medication for hip symptoms anymore.  He recently underwent a left reverse total shoulder arthroplasty performed by me on 15 November 2021.  He has done extremely well following that surgical intervention.  He did have some discomfort initially along the long head of the biceps tendon.  With physical therapy and time though symptoms have settled down and he now rates his function as excellent after the reverse total shoulder arthroplasty.  He is able to use his upper extremity for activities of daily living without any discomfort  Pain Location: LEFT shoulder and right hip  Radiation: none  Quality: dull, aching  Intensity/Severity: mild   Duration: several years  Progression of symptoms: no worsening, reports improvement  Onset quality: gradual   Timing: intermittent   Aggravating Factors: squatting, reaching backward  Alleviating Factors: steroid injection (intra-articular), rest, stretching/PT/OT  Previous Episodes: yes  Associated Symptoms: pain  ADLs Affected: ambulating, recreational activities/sports  Previous Treatment: physical/occupational therapy, intra-articular injection and prior surgery       Objective   Vital Signs:   Temp 98.6 °F (37 °C)   Ht 177.8 cm (70\")   Wt 77.1 kg (170 lb)   BMI 24.39 kg/m²     Physical Exam  Physical " Exam  Vitals signs and nursing note reviewed.   Constitutional:       Appearance: Normal appearance.   Pulmonary:      Effort: Pulmonary effort is normal.   Skin:     General: Skin is warm and dry.      Capillary Refill: Capillary refill takes less than 2 seconds.   Neurological:      General: No focal deficit present.      Mental Status: He is alert and oriented to person, place, and time. Mental status is at baseline.   Psychiatric:         Mood and Affect: Mood normal.         Behavior: Behavior normal.         Thought Content: Thought content normal.         Judgment: Judgment normal.     Ortho Exam     Right hip. Patient is post op 4 year(s) from total hip arthoplasty, direct anterior. Incision is clean and healing well. Calf is soft and nontender. Homans sign is negative. Skin and soft tissues are appropriate for postoperative status of the patient. Hip flexion is 0-90 degrees, hip abduction is 0-30 degrees. No limb lenth discrepancy. Neurovascular status is intact. Patients gait is significantly improved. The pain relief is extremely dramatic for the patient. Dorsalis pedis and posterior tibial artery pulses palpable. Common peroneal function is well preserved. There is no evidence of cellulitis or erythema or deep seated joint infection.      Left shoulder. Patient is postoperative 5 month(s). Afebrile. Vital signs are stable. Deltopectoral incision is clean and healing well. Axillary nerve function is well preserved. There is no glenohumeral instability. No clicking, popping or catching is noted. Apprehension sign is negative. Axillary nerve function is well preserved. Radial artery pulses are palpable. There is no clinical deformity. Range of motion is flexion 0-130 degrees, abduction 0-130 degrees.      Result Review :   The following data was reviewed by: Brian Francois MD on 04/14/2022:  Radiologic studies - see below for interpretation  xrays to be obtained at next visit            Procedures            Assessment   Assessment and Plan    Diagnoses and all orders for this visit:    1. S/P reverse total shoulder arthroplasty, left (Primary)    2. Status post total hip replacement, right          Follow Up   · Calcium and vitamin D for bone health.  · Falls and dislocation precautions both for the hip and the shoulder arthroplasty implants.  · , GI and dental procedure prophylaxis with antibiotics to prevent metastatic infection of the arthroplasty implants.  · Tablet meloxicam 7.5 mg tab 1 p.o. nightly for pain swelling and discomfort.  · Rest, ice, compression, and elevation (RICE) therapy  · Stretching and strengthening exercises  · OTC Ibuprofen 600mg by mouth every 6-8 hours as needed for pain and swelling  · Follow up in 1 year with x-rays  • Patient was given instructions and counseling regarding his condition or for health maintenance advice. Please see specific information pulled into the AVS if appropriate.     Brian Francois MD   Date of Encounter: 4/14/2022        EMR Dragon/Transcription disclaimer:  Much of this encounter note is an electronic transcription/translation of spoken language to printed text. The electronic translation of spoken language may permit erroneous, or at times, nonsensical words or phrases to be inadvertently transcribed; Although I have reviewed the note for such errors, some may still exist.

## 2023-06-26 PROBLEM — M16.12 PRIMARY OSTEOARTHRITIS OF LEFT HIP: Status: ACTIVE | Noted: 2023-06-26

## 2023-06-26 PROBLEM — M70.62 GREATER TROCHANTERIC BURSITIS OF LEFT HIP: Status: ACTIVE | Noted: 2023-06-26

## 2023-09-21 ENCOUNTER — TELEPHONE (OUTPATIENT)
Dept: ORTHOPEDIC SURGERY | Facility: CLINIC | Age: 73
End: 2023-09-21
Payer: MEDICARE

## 2023-09-21 NOTE — TELEPHONE ENCOUNTER
Caller: China Bocanegra    Relationship to patient: Emergency Contact    Best call back number: [phone]    Chief complaint: LEFT HIP PAIN    Type of visit: INJECTION    Requested date: NOT 09/27/23 OR 10/4/23, ANY OTHER TIME IS FINE    If rescheduling, when is the original appointment: 09/27/23     Additional notes: OKAY TO LVM

## 2023-09-27 ENCOUNTER — CLINICAL SUPPORT (OUTPATIENT)
Dept: ORTHOPEDIC SURGERY | Facility: CLINIC | Age: 73
End: 2023-09-27
Payer: MEDICARE

## 2023-09-27 VITALS — BODY MASS INDEX: 22.96 KG/M2 | HEIGHT: 70 IN | TEMPERATURE: 97.8 F | WEIGHT: 160.4 LBS

## 2023-09-27 DIAGNOSIS — M70.62 GREATER TROCHANTERIC BURSITIS OF LEFT HIP: Primary | ICD-10-CM

## 2023-09-27 RX ORDER — METHYLPREDNISOLONE ACETATE 80 MG/ML
160 INJECTION, SUSPENSION INTRA-ARTICULAR; INTRALESIONAL; INTRAMUSCULAR; SOFT TISSUE
Status: COMPLETED | OUTPATIENT
Start: 2023-09-27 | End: 2023-09-27

## 2023-09-27 RX ORDER — LIDOCAINE HYDROCHLORIDE 10 MG/ML
2 INJECTION, SOLUTION EPIDURAL; INFILTRATION; INTRACAUDAL; PERINEURAL
Status: COMPLETED | OUTPATIENT
Start: 2023-09-27 | End: 2023-09-27

## 2023-09-27 RX ADMIN — LIDOCAINE HYDROCHLORIDE 2 ML: 10 INJECTION, SOLUTION EPIDURAL; INFILTRATION; INTRACAUDAL; PERINEURAL at 12:51

## 2023-09-27 RX ADMIN — METHYLPREDNISOLONE ACETATE 160 MG: 80 INJECTION, SUSPENSION INTRA-ARTICULAR; INTRALESIONAL; INTRAMUSCULAR; SOFT TISSUE at 12:51

## 2023-09-27 NOTE — PROGRESS NOTES
"Chief Complaint  Pain and Follow-up of the Left Hip    Subjective    History of Present Illness      Jose D Bocanegra is a 73 y.o. male who presents to [facility] for injection therapy. He receives LEFT hip injections of Depo-Medrol. Since last injection, patient notes great relief of symptoms. He reports he started to experience some pain about 2-3 weeks ago but nothing as severe as before the shot. Treatment options have been discussed and he understands and consents.       Objective   Vital Signs:   Temp 97.8 °F (36.6 °C)   Ht 177.8 cm (70\")   Wt 72.8 kg (160 lb 6.4 oz)   BMI 23.02 kg/m²     Physical Exam  73 y.o. male is awake, alert, oriented, in no acute distress and well developed, well nourished.  Ortho Exam   LEFT hip  There is tenderness with palpation over the greater trochanteric bursa.   There is tenderness with palpation of the IT band.  Cross body adduction is positive.   Positive for pain over the greater trochanter with internal and external rotation.   There is no clinical deformity and no shortening of extremity.   He does have a limp upon walking.   There is piriformis tightness.  Dorsalis pedis and posterior tibial artery pulses are palpable.   Neurovascular status is intact and capillary refill is less than 2 seconds.   Common peroneal nerve function is well preserved.        Result Review :        PROCEDURE  Large Joint Arthrocentesis: L greater trochanteric bursa  Date/Time: 9/27/2023 12:51 PM  Consent given by: patient  Site marked: site marked  Timeout: Immediately prior to procedure a time out was called to verify the correct patient, procedure, equipment, support staff and site/side marked as required   Supporting Documentation  Indications: pain   Procedure Details  Location: hip - L greater trochanteric bursa  Preparation: Patient was prepped and draped in the usual sterile fashion  Needle size: 25 G  Approach: lateral  Medications administered: 2 mL " lidocaine PF 1% 1 %; 160 mg methylPREDNISolone acetate 80 MG/ML  Patient tolerance: patient tolerated the procedure well with no immediate complications           Injection site was identified by physical examination and cleaned with Betadine and alcohol swabs. Prior to needle insertion, ethyl chloride spray was used for surface anesthesia. Sterile technique was used.       Assessment   Assessment and Plan    Problem List Items Addressed This Visit          Musculoskeletal and Injuries    Greater trochanteric bursitis of left hip - Primary    Relevant Orders    Large Joint Arthrocentesis: L greater trochanteric bursa       Follow Up   Left hip-greater trochanteric bursa Depo-Medrol injection was discussed with the patient. Discussed indication, risks, benefits, and alternatives. Verbal consent was given to proceed with the procedure.   Injection was performed from lateral approach.  Patient tolerated the procedure well and no complications were encountered.  Discussion of orthopedic goals and activities and patient/guardian expressed understanding.  Ice, heat, rest, compression and elevation of extremity as beneficial  nsaids and/or tylenol as beneficial  Instructed to refrain from heavy activity/rest the extremity for the next 24-48 hours  Discussion regarding possibility of cortisol flare and what to expect if this occurs  Watch for signs and symptoms of infection  Call if adverse effect from injection therapy  Follow up in 3 months  Patient was given instructions and counseling regarding his condition or for health maintenance advice. Please see specific information pulled into the AVS if appropriate.     Kamar Jacob PA-C   Date of Encounter: 9/27/2023   Electronically signed by Kamar Jacob PA-C, 09/27/23, 12:59 PM EDT.     EMR Dragon/Transcription disclaimer:  Much of this encounter note is an electronic transcription/translation of spoken language to printed text. The electronic translation of  spoken language may permit erroneous, or at times, nonsensical words or phrases to be inadvertently transcribed; Although I have reviewed the note for such errors, some may still exist.

## 2023-12-18 ENCOUNTER — TELEPHONE (OUTPATIENT)
Dept: ORTHOPEDIC SURGERY | Facility: CLINIC | Age: 73
End: 2023-12-18

## 2023-12-18 ENCOUNTER — CLINICAL SUPPORT (OUTPATIENT)
Dept: ORTHOPEDIC SURGERY | Facility: CLINIC | Age: 73
End: 2023-12-18
Payer: MEDICARE

## 2023-12-18 VITALS — TEMPERATURE: 97.8 F | HEIGHT: 70 IN | WEIGHT: 160 LBS | BODY MASS INDEX: 22.9 KG/M2

## 2023-12-18 DIAGNOSIS — M70.62 GREATER TROCHANTERIC BURSITIS OF LEFT HIP: Primary | ICD-10-CM

## 2023-12-18 RX ORDER — METHYLPREDNISOLONE ACETATE 80 MG/ML
160 INJECTION, SUSPENSION INTRA-ARTICULAR; INTRALESIONAL; INTRAMUSCULAR; SOFT TISSUE
Status: COMPLETED | OUTPATIENT
Start: 2023-12-18 | End: 2023-12-18

## 2023-12-18 RX ORDER — LIDOCAINE HYDROCHLORIDE 20 MG/ML
2 INJECTION, SOLUTION EPIDURAL; INFILTRATION; INTRACAUDAL; PERINEURAL
Status: COMPLETED | OUTPATIENT
Start: 2023-12-18 | End: 2023-12-18

## 2023-12-18 RX ADMIN — LIDOCAINE HYDROCHLORIDE 2 ML: 20 INJECTION, SOLUTION EPIDURAL; INFILTRATION; INTRACAUDAL; PERINEURAL at 10:44

## 2023-12-18 RX ADMIN — METHYLPREDNISOLONE ACETATE 160 MG: 80 INJECTION, SUSPENSION INTRA-ARTICULAR; INTRALESIONAL; INTRAMUSCULAR; SOFT TISSUE at 10:44

## 2023-12-18 NOTE — TELEPHONE ENCOUNTER
Called the patient and advised we do not have any cancellations at this time to be able to move his appt up.

## 2023-12-18 NOTE — PROGRESS NOTES
"Chief Complaint  Follow-up of the Left Hip (injection)    Subjective    History of Present Illness      Jose D Bocanegra is a 73 y.o. male who presents to [facility] for injection therapy. He receives LEFT hip injections of Depo-Medrol. Since last injection, patient notes great relief of symptoms. He reports he started to experience some pain about 2-3 weeks ago. Treatment options have been discussed and he understands and consents.       Objective   Vital Signs:   Temp 97.8 °F (36.6 °C)   Ht 177.8 cm (70\")   Wt 72.6 kg (160 lb)   BMI 22.96 kg/m²     Physical Exam  73 y.o. male is awake, alert, oriented, in no acute distress and well developed, well nourished.  Ortho Exam   LEFT hip  There is tenderness with palpation over the greater trochanteric bursa.   There is tenderness with palpation of the IT band.  Cross body adduction is positive.   Positive for pain over the greater trochanter with internal and external rotation.   There is no clinical deformity and no shortening of extremity.   He does have a limp upon walking.   There is piriformis tightness.  Dorsalis pedis and posterior tibial artery pulses are palpable.   Neurovascular status is intact and capillary refill is less than 2 seconds.   Common peroneal nerve function is well preserved.        Result Review :        PROCEDURE  - Large Joint Arthrocentesis: L greater trochanteric bursa on 12/18/2023 10:44 AM  Indications: pain  Details: 22 G needle, lateral approach  Medications: 160 mg methylPREDNISolone acetate 80 MG/ML; 2 mL lidocaine PF 2% 2 %  Outcome: tolerated well, no immediate complications  Procedure, treatment alternatives, risks and benefits explained, specific risks discussed. Consent was given by the patient. Immediately prior to procedure a time out was called to verify the correct patient, procedure, equipment, support staff and site/side marked as required. Patient was prepped and draped in the usual sterile " fashion.              Injection site was identified by physical examination and cleaned with Betadine and alcohol swabs. Prior to needle insertion, ethyl chloride spray was used for surface anesthesia. Sterile technique was used.       Assessment   Assessment and Plan    Problem List Items Addressed This Visit          Musculoskeletal and Injuries    Greater trochanteric bursitis of left hip - Primary    Relevant Orders    - Large Joint Arthrocentesis         Follow Up   Left hip-greater trochanteric bursa Depo-Medrol injection was discussed with the patient. Discussed indication, risks, benefits, and alternatives. Verbal consent was given to proceed with the procedure.   Injection was performed from lateral approach.  Patient tolerated the procedure well and no complications were encountered.  Discussion of orthopedic goals and activities and patient/guardian expressed understanding.  Ice, heat, rest, compression and elevation of extremity as beneficial  nsaids and/or tylenol as beneficial  Instructed to refrain from heavy activity/rest the extremity for the next 24-48 hours  Discussion regarding possibility of cortisol flare and what to expect if this occurs  Watch for signs and symptoms of infection  Call if adverse effect from injection therapy  Follow up in 3 months  Patient was given instructions and counseling regarding his condition or for health maintenance advice. Please see specific information pulled into the AVS if appropriate.     Kamar Jacob PA-C   Date of Encounter: 12/18/2023   Electronically signed by Kamar Jacob PA-C, 12/18/23, 10:45 AM EST.     EMR Dragon/Transcription disclaimer:  Much of this encounter note is an electronic transcription/translation of spoken language to printed text. The electronic translation of spoken language may permit erroneous, or at times, nonsensical words or phrases to be inadvertently transcribed; Although I have reviewed the note for such errors,  some may still exist.

## 2023-12-18 NOTE — TELEPHONE ENCOUNTER
Caller: China Bocanegra    Relationship to patient: Emergency Contact    Best call back number: [phone]    Type of visit: INJECTION     Requested date: 12/18/2023 MORNING      If rescheduling, when is the original appointment: 12/18/2023 @3:30    Additional notes:UNABLE TO WARM TRANSFER